# Patient Record
Sex: MALE | Race: WHITE | URBAN - METROPOLITAN AREA
[De-identification: names, ages, dates, MRNs, and addresses within clinical notes are randomized per-mention and may not be internally consistent; named-entity substitution may affect disease eponyms.]

---

## 2020-08-21 ENCOUNTER — TRANSCRIBE ORDERS (OUTPATIENT)
Dept: LAB | Facility: HOSPITAL | Age: 60
End: 2020-08-21

## 2022-10-13 ENCOUNTER — LAB REQUISITION (OUTPATIENT)
Dept: LAB | Facility: HOSPITAL | Age: 62
End: 2022-10-13
Payer: COMMERCIAL

## 2022-10-13 DIAGNOSIS — D23.111 OTHER BENIGN NEOPLASM OF SKIN OF RIGHT UPPER EYELID, INCLUDING CANTHUS: ICD-10-CM

## 2022-10-13 PROCEDURE — 88304 TISSUE EXAM BY PATHOLOGIST: CPT | Performed by: PATHOLOGY

## 2022-10-26 PROCEDURE — 88304 TISSUE EXAM BY PATHOLOGIST: CPT | Performed by: PATHOLOGY

## 2023-06-23 ENCOUNTER — TELEPHONE (OUTPATIENT)
Age: 63
End: 2023-06-23

## 2023-06-23 NOTE — TELEPHONE ENCOUNTER
[facility] Assessment    Name: Tory Salomon  YOB: 1960  Last Height: 6'  Last weight: 220 lb  BMI: 29 8  Procedure: Colon  Diagnosis: Hx polyps  Date of procedure: 8/23/23  Prep:   Responsible : Jackie Martin  Phone#: [phone]  Name completing form: Zabrina Lorenzo  Date form completed: 06/23/23      If the patient answers yes to any of these questions, schedule in a hospital  Are you pregnant: No  Do you rely on a wheelchair for mobility: No  Have you been diagnosed with End Stage Renal Disease (ESRD): No  Do you need oxygen during the day: No  Have you had a heart attack or stroke within the past three months: No  Have you had a seizure within the past three months: No  Have you ever been informed by anesthesia that you have a difficult airway: No  Additional Questions  Have you had any cardiac testing or are under the care of a Cardiologist (see cardiac list): No  Cardiac list:   Do you have an implanted cardiac defibrillator: No (Comment:  This patient should be scheduled in the hospital)    Have any bleeding problems, such as anemia or hemophilia (If patient has H&H result below 8, schedule in hospital   H&H must be within 30 days of procedure): No    Had an organ transplant within the past 3 months: No    Do you have any present infections: No  Do you get short of breath when walking a few blocks: No  Have you been diagnosed with diabetes: Yes  Comments (provide cardiac provider information if applicable):

## 2023-06-23 NOTE — TELEPHONE ENCOUNTER
06/23/23  Screened by: Kyler Leger    Referring Provider     Pre- Screening: There is no height or weight on file to calculate BMI  Has patient been referred for a routine screening Colonoscopy? yes  Is the patient between 39-70 years old? yes      Previous Colonoscopy yes   If yes:    Date: 12/12/2017    Facility:     Reason:       SCHEDULING STAFF: If the patient is between 45yrs-49yrs, please advise patient to confirm benefits/coverage with their insurance company for a routine screening colonoscopy, some insurance carriers will only cover at [id number] or older  If the patient is over 66years old, please schedule an office visit  Does the patient want to see a Gastroenterologist prior to their procedure OR are they having any GI symptoms? no    Has the patient been hospitalized or had abdominal surgery in the past 6 months? no    Does the patient use supplemental oxygen? no    Does the patient take Coumadin, Lovenox, Plavix, Elliquis, Xarelto, or other blood thinning medication? no    Has the patient had a stroke, cardiac event, or stent placed in the past year? no     PT PASSED OA    SCHEDULING STAFF: If patient answers NO to above questions, then schedule procedure  If patient answers YES to above questions, then schedule office appointment  If patient is between 45yrs - 49yrs, please advise patient that we will have to confirm benefits & coverage with their insurance company for a routine screening colonoscopy

## 2023-06-23 NOTE — TELEPHONE ENCOUNTER
Scheduled date of colonoscopy (as of today): 8/23/23  Physician performing colonoscopy: Dr Keenan Kellogg  Location of colonoscopy:  [facility]    Clearances: N//A

## 2023-08-08 ENCOUNTER — TELEPHONE (OUTPATIENT)
Dept: GASTROENTEROLOGY | Facility: CLINIC | Age: 63
End: 2023-08-08

## 2023-08-08 NOTE — TELEPHONE ENCOUNTER
Spoke to wife confirming her 's colonoscopy on 8/23. She or her son will be his , will be called day prior with arrival time and I am mailing the OTC prep.

## 2023-08-23 ENCOUNTER — ANESTHESIA (OUTPATIENT)
Dept: GASTROENTEROLOGY | Facility: AMBULARY SURGERY CENTER | Age: 63
End: 2023-08-23

## 2023-08-23 ENCOUNTER — ANESTHESIA EVENT (OUTPATIENT)
Dept: GASTROENTEROLOGY | Facility: AMBULARY SURGERY CENTER | Age: 63
End: 2023-08-23

## 2023-08-23 ENCOUNTER — HOSPITAL ENCOUNTER (OUTPATIENT)
Dept: GASTROENTEROLOGY | Facility: AMBULARY SURGERY CENTER | Age: 63
Setting detail: OUTPATIENT SURGERY
Discharge: HOME | End: 2023-08-23
Attending: INTERNAL MEDICINE
Payer: COMMERCIAL

## 2023-08-23 VITALS
OXYGEN SATURATION: 99 % | SYSTOLIC BLOOD PRESSURE: 171 MMHG | WEIGHT: 211 LBS | DIASTOLIC BLOOD PRESSURE: 91 MMHG | TEMPERATURE: 97.2 F | HEIGHT: 72 IN | RESPIRATION RATE: 18 BRPM | BODY MASS INDEX: 28.58 KG/M2 | HEART RATE: 60 BPM

## 2023-08-23 DIAGNOSIS — Z86.010 HISTORY OF COLON POLYPS: ICD-10-CM

## 2023-08-23 LAB — GLUCOSE SERPL-MCNC: 128 MG/DL (ref 65–140)

## 2023-08-23 PROCEDURE — 45380 COLONOSCOPY AND BIOPSY: CPT | Performed by: INTERNAL MEDICINE

## 2023-08-23 PROCEDURE — 82948 REAGENT STRIP/BLOOD GLUCOSE: CPT

## 2023-08-23 PROCEDURE — 88305 TISSUE EXAM BY PATHOLOGIST: CPT | Performed by: PATHOLOGY

## 2023-08-23 RX ORDER — METFORMIN HYDROCHLORIDE 500 MG/1
TABLET, EXTENDED RELEASE ORAL DAILY
COMMUNITY
Start: 2023-06-05

## 2023-08-23 RX ORDER — ATORVASTATIN CALCIUM 20 MG/1
TABLET, FILM COATED ORAL DAILY
COMMUNITY
Start: 2023-06-07

## 2023-08-23 RX ORDER — LOSARTAN POTASSIUM 50 MG/1
TABLET ORAL DAILY
COMMUNITY
Start: 2023-07-28

## 2023-08-23 RX ORDER — SODIUM CHLORIDE, SODIUM LACTATE, POTASSIUM CHLORIDE, CALCIUM CHLORIDE 600; 310; 30; 20 MG/100ML; MG/100ML; MG/100ML; MG/100ML
INJECTION, SOLUTION INTRAVENOUS CONTINUOUS PRN
Status: DISCONTINUED | OUTPATIENT
Start: 2023-08-23 | End: 2023-08-23

## 2023-08-23 RX ORDER — MULTIVITAMIN
1 TABLET ORAL DAILY
COMMUNITY

## 2023-08-23 RX ORDER — PROPOFOL 10 MG/ML
INJECTION, EMULSION INTRAVENOUS AS NEEDED
Status: DISCONTINUED | OUTPATIENT
Start: 2023-08-23 | End: 2023-08-23

## 2023-08-23 RX ADMIN — PROPOFOL 50 MG: 10 INJECTION, EMULSION INTRAVENOUS at 07:46

## 2023-08-23 RX ADMIN — PROPOFOL 50 MG: 10 INJECTION, EMULSION INTRAVENOUS at 07:41

## 2023-08-23 RX ADMIN — PROPOFOL 80 MG: 10 INJECTION, EMULSION INTRAVENOUS at 07:38

## 2023-08-23 RX ADMIN — PROPOFOL 130 MG: 10 INJECTION, EMULSION INTRAVENOUS at 07:35

## 2023-08-23 RX ADMIN — Medication 40 MG: at 07:40

## 2023-08-23 RX ADMIN — PROPOFOL 50 MG: 10 INJECTION, EMULSION INTRAVENOUS at 07:44

## 2023-08-23 RX ADMIN — PROPOFOL 40 MG: 10 INJECTION, EMULSION INTRAVENOUS at 07:37

## 2023-08-23 RX ADMIN — SODIUM CHLORIDE, SODIUM LACTATE, POTASSIUM CHLORIDE, AND CALCIUM CHLORIDE: .6; .31; .03; .02 INJECTION, SOLUTION INTRAVENOUS at 07:33

## 2023-08-23 NOTE — ANESTHESIA PREPROCEDURE EVALUATION
Procedure:  COLONOSCOPY    Relevant Problems   No relevant active problems     htn, dm, hld    Physical Exam    Airway    Mallampati score: II  TM Distance: >3 FB  Neck ROM: full     Dental       Cardiovascular  Cardiovascular exam normal    Pulmonary  Pulmonary exam normal     Other Findings        Anesthesia Plan  ASA Score- 2     Anesthesia Type- IV sedation with anesthesia with ASA Monitors. Additional Monitors:   Airway Plan:           Plan Factors-Exercise tolerance (METS): >4 METS. Chart reviewed. EKG reviewed. Imaging results reviewed. Existing labs reviewed. Patient summary reviewed. Patient is not a current smoker. Patient did not smoke on day of surgery. Obstructive sleep apnea risk education given perioperatively. Induction- intravenous. Postoperative Plan- Plan for postoperative opioid use. Informed Consent- Anesthetic plan and risks discussed with patient. I personally reviewed this patient with the CRNA. Discussed and agreed on the Anesthesia Plan with the CRNA. Richard Steiner

## 2023-08-23 NOTE — ANESTHESIA POSTPROCEDURE EVALUATION
Post-Op Assessment Note    CV Status:  Stable    Pain management: adequate     Mental Status:  Alert and awake   Hydration Status:  Euvolemic   PONV Controlled:  Controlled   Airway Patency:  Patent      Post Op Vitals Reviewed: Yes      Staff: Anesthesiologist, CRNA         No notable events documented.     BP  121/78   Temp 97   Pulse 67   Resp 12   SpO2 100

## 2023-08-23 NOTE — H&P
History and Physical -  [facility]  Kristopher Dugan 61 y.o. male MRN: [id number]        HPI: 61-year-old male with history of diabetes mellitus, hypertension, colon polyps. Regular bowel movements. Historical Information   Past Medical History:   Diagnosis Date   • Colon polyp    • Diabetes mellitus ([address])    • Hyperlipidemia    • Hypertension    • Seasonal allergies      Past Surgical History:   Procedure Laterality Date   • COLONOSCOPY     • EYE SURGERY       Social History   Social History     Substance and Sexual Activity   Alcohol Use Yes    Comment: daily     Social History     Substance and Sexual Activity   Drug Use Never     Social History     Tobacco Use   Smoking Status Some Days   • Types: Cigars   Smokeless Tobacco Never     Family History   Problem Relation Age of Onset   • Cancer Mother        Meds/Allergies     (Not in a hospital admission)      No Known Allergies    Objective     Blood pressure (!) 176/90, pulse 57, temperature (!) 96.7 °F (35.9 °C), temperature source Temporal, resp. rate 18, height 6' (1.829 m), weight 95.7 kg (211 lb), SpO2 99 %.     PHYSICAL EXAM:    Gen: NAD  CV: S1 & S2 normal, RRR  CHEST: Clear to auscultate  ABD: soft, NT/ND, good bowel sounds  EXT: no edema    ASSESSMENT:     History of colon polyps    PLAN:    Colonoscopy

## 2023-08-29 PROCEDURE — 88305 TISSUE EXAM BY PATHOLOGIST: CPT | Performed by: PATHOLOGY

## 2025-07-01 ENCOUNTER — HOSPITAL ENCOUNTER (INPATIENT)
Facility: HOSPITAL | Age: 65
LOS: 2 days | Discharge: HOME | End: 2025-07-03
Attending: EMERGENCY MEDICINE | Admitting: INTERNAL MEDICINE
Payer: COMMERCIAL

## 2025-07-01 ENCOUNTER — APPOINTMENT (EMERGENCY)
Dept: CT IMAGING | Facility: HOSPITAL | Age: 65
End: 2025-07-01
Payer: COMMERCIAL

## 2025-07-01 ENCOUNTER — APPOINTMENT (EMERGENCY)
Dept: RADIOLOGY | Facility: HOSPITAL | Age: 65
End: 2025-07-01
Payer: COMMERCIAL

## 2025-07-01 DIAGNOSIS — E87.1 HYPONATREMIA: Primary | ICD-10-CM

## 2025-07-01 DIAGNOSIS — R53.83 FATIGUE: ICD-10-CM

## 2025-07-01 DIAGNOSIS — D69.6 THROMBOCYTOPENIA (HCC): ICD-10-CM

## 2025-07-01 DIAGNOSIS — I73.9 PAD (PERIPHERAL ARTERY DISEASE) (HCC): ICD-10-CM

## 2025-07-01 DIAGNOSIS — F10.10 ALCOHOL ABUSE: ICD-10-CM

## 2025-07-01 DIAGNOSIS — R76.8 POSITIVE LYME DISEASE SEROLOGY: ICD-10-CM

## 2025-07-01 DIAGNOSIS — R42 DIZZINESS: ICD-10-CM

## 2025-07-01 PROBLEM — E11.9 DM2 (DIABETES MELLITUS, TYPE 2) (HCC): Status: ACTIVE | Noted: 2025-07-01

## 2025-07-01 PROBLEM — I10 HTN (HYPERTENSION): Status: ACTIVE | Noted: 2025-07-01

## 2025-07-01 PROBLEM — R79.89 ELEVATED LFTS: Status: ACTIVE | Noted: 2025-07-01

## 2025-07-01 LAB
ALBUMIN SERPL BCG-MCNC: 3.5 G/DL (ref 3.5–5)
ALP SERPL-CCNC: 73 U/L (ref 34–104)
ALT SERPL W P-5'-P-CCNC: 33 U/L (ref 7–52)
ANION GAP SERPL CALCULATED.3IONS-SCNC: 10 MMOL/L (ref 4–13)
ANION GAP SERPL CALCULATED.3IONS-SCNC: 7 MMOL/L (ref 4–13)
ANION GAP SERPL CALCULATED.3IONS-SCNC: 8 MMOL/L (ref 4–13)
ANION GAP SERPL CALCULATED.3IONS-SCNC: 8 MMOL/L (ref 4–13)
AST SERPL W P-5'-P-CCNC: 59 U/L (ref 13–39)
ATRIAL RATE: 107 BPM
BASOPHILS # BLD MANUAL: 0 THOUSAND/UL (ref 0–0.1)
BASOPHILS NFR MAR MANUAL: 0 % (ref 0–1)
BILIRUB SERPL-MCNC: 2.41 MG/DL (ref 0.2–1)
BUN SERPL-MCNC: 29 MG/DL (ref 5–25)
BUN SERPL-MCNC: 31 MG/DL (ref 5–25)
BUN SERPL-MCNC: 32 MG/DL (ref 5–25)
BUN SERPL-MCNC: 33 MG/DL (ref 5–25)
CALCIUM SERPL-MCNC: 8.2 MG/DL (ref 8.4–10.2)
CALCIUM SERPL-MCNC: 8.2 MG/DL (ref 8.4–10.2)
CALCIUM SERPL-MCNC: 8.3 MG/DL (ref 8.4–10.2)
CALCIUM SERPL-MCNC: 8.4 MG/DL (ref 8.4–10.2)
CHLORIDE SERPL-SCNC: 91 MMOL/L (ref 96–108)
CHLORIDE SERPL-SCNC: 95 MMOL/L (ref 96–108)
CHLORIDE SERPL-SCNC: 96 MMOL/L (ref 96–108)
CHLORIDE SERPL-SCNC: 96 MMOL/L (ref 96–108)
CO2 SERPL-SCNC: 22 MMOL/L (ref 21–32)
CO2 SERPL-SCNC: 23 MMOL/L (ref 21–32)
CREAT SERPL-MCNC: 0.92 MG/DL (ref 0.6–1.3)
CREAT SERPL-MCNC: 0.92 MG/DL (ref 0.6–1.3)
CREAT SERPL-MCNC: 0.99 MG/DL (ref 0.6–1.3)
CREAT SERPL-MCNC: 1.15 MG/DL (ref 0.6–1.3)
D DIMER PPP FEU-MCNC: 4.08 UG/ML FEU
EOSINOPHIL # BLD MANUAL: 0 THOUSAND/UL (ref 0–0.4)
EOSINOPHIL NFR BLD MANUAL: 0 % (ref 0–6)
ERYTHROCYTE [DISTWIDTH] IN BLOOD BY AUTOMATED COUNT: 13.9 % (ref 11.6–15.1)
EST. AVERAGE GLUCOSE BLD GHB EST-MCNC: 169 MG/DL
FOLATE SERPL-MCNC: >22.3 NG/ML
GFR SERPL CREATININE-BSD FRML MDRD: 66 ML/MIN/1.73SQ M
GFR SERPL CREATININE-BSD FRML MDRD: 79 ML/MIN/1.73SQ M
GFR SERPL CREATININE-BSD FRML MDRD: 86 ML/MIN/1.73SQ M
GFR SERPL CREATININE-BSD FRML MDRD: 86 ML/MIN/1.73SQ M
GLUCOSE SERPL-MCNC: 141 MG/DL (ref 65–140)
GLUCOSE SERPL-MCNC: 145 MG/DL (ref 65–140)
GLUCOSE SERPL-MCNC: 154 MG/DL (ref 65–140)
GLUCOSE SERPL-MCNC: 167 MG/DL (ref 65–140)
GLUCOSE SERPL-MCNC: 197 MG/DL (ref 65–140)
GLUCOSE SERPL-MCNC: 201 MG/DL (ref 65–140)
GLUCOSE SERPL-MCNC: 202 MG/DL (ref 65–140)
HBA1C MFR BLD: 7.5 %
HCT VFR BLD AUTO: 38 % (ref 36.5–49.3)
HGB BLD-MCNC: 13.1 G/DL (ref 12–17)
INR PPP: 1.08 (ref 0.85–1.19)
LYMPHOCYTES # BLD AUTO: 1.68 THOUSAND/UL (ref 0.6–4.47)
LYMPHOCYTES # BLD AUTO: 27 % (ref 14–44)
MCH RBC QN AUTO: 31.9 PG (ref 26.8–34.3)
MCHC RBC AUTO-ENTMCNC: 34.5 G/DL (ref 31.4–37.4)
MCV RBC AUTO: 93 FL (ref 82–98)
MONOCYTES # BLD AUTO: 2.04 THOUSAND/UL (ref 0–1.22)
MONOCYTES NFR BLD: 34 % (ref 4–12)
NEUTROPHILS # BLD MANUAL: 2.28 THOUSAND/UL (ref 1.85–7.62)
NEUTS BAND NFR BLD MANUAL: 2 % (ref 0–8)
NEUTS SEG NFR BLD AUTO: 36 % (ref 43–75)
OSMOLALITY UR/SERPL-RTO: 294 MMOL/KG (ref 282–298)
OSMOLALITY UR: 745 MMOL/KG (ref 250–900)
P AXIS: 54 DEGREES
PLATELET # BLD AUTO: 67 THOUSANDS/UL (ref 149–390)
PLATELET BLD QL SMEAR: ABNORMAL
PMV BLD AUTO: 10.2 FL (ref 8.9–12.7)
POLYCHROMASIA BLD QL SMEAR: PRESENT
POTASSIUM SERPL-SCNC: 3.9 MMOL/L (ref 3.5–5.3)
POTASSIUM SERPL-SCNC: 4.1 MMOL/L (ref 3.5–5.3)
POTASSIUM SERPL-SCNC: 4.2 MMOL/L (ref 3.5–5.3)
POTASSIUM SERPL-SCNC: 4.3 MMOL/L (ref 3.5–5.3)
PR INTERVAL: 178 MS
PROT SERPL-MCNC: 8 G/DL (ref 6.4–8.4)
PROTHROMBIN TIME: 14.8 SECONDS (ref 12.3–15)
QRS AXIS: -36 DEGREES
QRSD INTERVAL: 114 MS
QT INTERVAL: 344 MS
QTC INTERVAL: 459 MS
RBC # BLD AUTO: 4.11 MILLION/UL (ref 3.88–5.62)
RBC MORPH BLD: PRESENT
SODIUM SERPL-SCNC: 123 MMOL/L (ref 135–147)
SODIUM SERPL-SCNC: 126 MMOL/L (ref 135–147)
SODIUM SERPL-SCNC: 126 MMOL/L (ref 135–147)
SODIUM SERPL-SCNC: 127 MMOL/L (ref 135–147)
SODIUM UR-SCNC: 21 MMOL/L
T WAVE AXIS: 43 DEGREES
T4 FREE SERPL-MCNC: 1.02 NG/DL (ref 0.61–1.12)
TSH SERPL DL<=0.05 MIU/L-ACNC: 4.92 UIU/ML (ref 0.45–4.5)
VARIANT LYMPHS # BLD AUTO: 1 %
VENTRICULAR RATE: 107 BPM
VIT B12 SERPL-MCNC: 1696 PG/ML (ref 180–914)
WBC # BLD AUTO: 6.01 THOUSAND/UL (ref 4.31–10.16)

## 2025-07-01 PROCEDURE — 85610 PROTHROMBIN TIME: CPT | Performed by: PHYSICIAN ASSISTANT

## 2025-07-01 PROCEDURE — 99285 EMERGENCY DEPT VISIT HI MDM: CPT | Performed by: EMERGENCY MEDICINE

## 2025-07-01 PROCEDURE — 82746 ASSAY OF FOLIC ACID SERUM: CPT | Performed by: INTERNAL MEDICINE

## 2025-07-01 PROCEDURE — 86618 LYME DISEASE ANTIBODY: CPT | Performed by: PHYSICIAN ASSISTANT

## 2025-07-01 PROCEDURE — 36415 COLL VENOUS BLD VENIPUNCTURE: CPT

## 2025-07-01 PROCEDURE — 80053 COMPREHEN METABOLIC PANEL: CPT

## 2025-07-01 PROCEDURE — 84439 ASSAY OF FREE THYROXINE: CPT | Performed by: PHYSICIAN ASSISTANT

## 2025-07-01 PROCEDURE — 83935 ASSAY OF URINE OSMOLALITY: CPT | Performed by: PHYSICIAN ASSISTANT

## 2025-07-01 PROCEDURE — 84300 ASSAY OF URINE SODIUM: CPT | Performed by: PHYSICIAN ASSISTANT

## 2025-07-01 PROCEDURE — 82948 REAGENT STRIP/BLOOD GLUCOSE: CPT

## 2025-07-01 PROCEDURE — 86617 LYME DISEASE ANTIBODY: CPT | Performed by: PHYSICIAN ASSISTANT

## 2025-07-01 PROCEDURE — 93010 ELECTROCARDIOGRAM REPORT: CPT | Performed by: INTERNAL MEDICINE

## 2025-07-01 PROCEDURE — 71046 X-RAY EXAM CHEST 2 VIEWS: CPT

## 2025-07-01 PROCEDURE — 83930 ASSAY OF BLOOD OSMOLALITY: CPT | Performed by: PHYSICIAN ASSISTANT

## 2025-07-01 PROCEDURE — 85379 FIBRIN DEGRADATION QUANT: CPT

## 2025-07-01 PROCEDURE — 84443 ASSAY THYROID STIM HORMONE: CPT | Performed by: PHYSICIAN ASSISTANT

## 2025-07-01 PROCEDURE — 85007 BL SMEAR W/DIFF WBC COUNT: CPT

## 2025-07-01 PROCEDURE — 71275 CT ANGIOGRAPHY CHEST: CPT

## 2025-07-01 PROCEDURE — 83036 HEMOGLOBIN GLYCOSYLATED A1C: CPT | Performed by: PHYSICIAN ASSISTANT

## 2025-07-01 PROCEDURE — 82607 VITAMIN B-12: CPT | Performed by: PHYSICIAN ASSISTANT

## 2025-07-01 PROCEDURE — 74177 CT ABD & PELVIS W/CONTRAST: CPT

## 2025-07-01 PROCEDURE — 82746 ASSAY OF FOLIC ACID SERUM: CPT | Performed by: PHYSICIAN ASSISTANT

## 2025-07-01 PROCEDURE — 93005 ELECTROCARDIOGRAM TRACING: CPT

## 2025-07-01 PROCEDURE — 99285 EMERGENCY DEPT VISIT HI MDM: CPT

## 2025-07-01 PROCEDURE — 80048 BASIC METABOLIC PNL TOTAL CA: CPT | Performed by: PHYSICIAN ASSISTANT

## 2025-07-01 PROCEDURE — 99223 1ST HOSP IP/OBS HIGH 75: CPT | Performed by: PHYSICIAN ASSISTANT

## 2025-07-01 PROCEDURE — 85027 COMPLETE CBC AUTOMATED: CPT

## 2025-07-01 RX ORDER — LANOLIN ALCOHOL/MO/W.PET/CERES
100 CREAM (GRAM) TOPICAL DAILY
Status: DISCONTINUED | OUTPATIENT
Start: 2025-07-02 | End: 2025-07-03 | Stop reason: HOSPADM

## 2025-07-01 RX ORDER — ATORVASTATIN CALCIUM 20 MG/1
20 TABLET, FILM COATED ORAL
Status: DISCONTINUED | OUTPATIENT
Start: 2025-07-01 | End: 2025-07-03 | Stop reason: HOSPADM

## 2025-07-01 RX ORDER — AZELASTINE 1 MG/ML
1 SPRAY, METERED NASAL 2 TIMES DAILY
Status: DISCONTINUED | OUTPATIENT
Start: 2025-07-01 | End: 2025-07-03 | Stop reason: HOSPADM

## 2025-07-01 RX ORDER — AMLODIPINE BESYLATE 5 MG/1
5 TABLET ORAL DAILY
Status: DISCONTINUED | OUTPATIENT
Start: 2025-07-01 | End: 2025-07-01

## 2025-07-01 RX ORDER — AZELASTINE HYDROCHLORIDE 137 UG/1
1-2 SPRAY, METERED NASAL 2 TIMES DAILY
COMMUNITY
Start: 2025-06-20

## 2025-07-01 RX ORDER — ONDANSETRON 2 MG/ML
4 INJECTION INTRAMUSCULAR; INTRAVENOUS EVERY 6 HOURS PRN
Status: DISCONTINUED | OUTPATIENT
Start: 2025-07-01 | End: 2025-07-03 | Stop reason: HOSPADM

## 2025-07-01 RX ORDER — AMLODIPINE BESYLATE 5 MG/1
5 TABLET ORAL DAILY
COMMUNITY
Start: 2025-03-11 | End: 2025-07-03

## 2025-07-01 RX ORDER — INSULIN LISPRO 100 [IU]/ML
1-5 INJECTION, SOLUTION INTRAVENOUS; SUBCUTANEOUS
Status: DISCONTINUED | OUTPATIENT
Start: 2025-07-01 | End: 2025-07-03 | Stop reason: HOSPADM

## 2025-07-01 RX ADMIN — IOHEXOL 85 ML: 350 INJECTION, SOLUTION INTRAVENOUS at 11:24

## 2025-07-01 RX ADMIN — AZELASTINE 1 SPRAY: 1 SPRAY, METERED NASAL at 21:16

## 2025-07-01 RX ADMIN — SODIUM CHLORIDE 1000 ML: 0.9 INJECTION, SOLUTION INTRAVENOUS at 10:20

## 2025-07-01 RX ADMIN — ATORVASTATIN CALCIUM 20 MG: 20 TABLET, FILM COATED ORAL at 17:29

## 2025-07-01 NOTE — ASSESSMENT & PLAN NOTE
Patient presented with generalized weakness, fatigue, lightheadedness and significant decreased appetite. Also episodes of nausea and vomiting noted in the past 2 days  No prior labs with which to compare since 1 year ago at which point sodium level was normal  Likely hyponatremia is due to significant decrease in p.o. intake for approximately a week.  Has excessive fluid intake>>> solute intake  Received 1 L of normal saline on admission  Check osmolality studies and TSH   encourage solute intake as tolerated  Check BMP every 6 hours for now   Consider nephrology consult

## 2025-07-01 NOTE — H&P
H&P - Hospitalist   Name: Robbie Cordero 65 y.o. male I MRN: [id number]  Unit/Bed#: W -01 I Date of Admission: 7/1/2025   Date of Service: 7/1/2025 I Hospital Day: 0     Assessment & Plan  Hyponatremia  Patient presented with generalized weakness, fatigue, lightheadedness and significant decreased appetite. Also episodes of nausea and vomiting noted in the past 2 days  No prior labs with which to compare since 1 year ago at which point sodium level was normal  Likely hyponatremia is due to significant decrease in p.o. intake for approximately a week.  Has excessive fluid intake>>> solute intake  Received 1 L of normal saline on admission  Check osmolality studies and TSH   encourage solute intake as tolerated  Check BMP every 6 hours for now   Consider nephrology consult  Thrombocytopenia (HCC)  Platelet count 67, new compared to 1 year ago  Possibly due to alcohol abuse versus untreated Lyme versus other  Recheck in a.m.  Noted increased monocytes on diff but no mono symptoms  Lyme disease  Lyme studies positive December 2024 as noted by chart review of [facility].  Patient however states he was told he was negative for Lyme disease and given 1 dose of doxycycline but chart review from [facility] clearly reflects prescription for 2 weeks of doxycycline. Reports that he had classic target lesion and rash at the time  Unclear if some of his current symptoms including thrombocytopenia and generalized fatigue could also be attributed to this  Recheck Lyme and consider d/w ID if patient was in fact not compliant with the prescribed Lyme disease treatment  DM2 (diabetes mellitus, type 2) ([facility])  Lab Results   Component Value Date    HGBA1C 7.6 (H) 12/19/2024   A1c at [facility] 7.2  Recent Labs     07/01/25  0922   POCGLU 202*   Hold metformin and Jardiance.  Discontinue Amaryl which patient reported intolerance to  Monitor on Accu-Cheks with sliding scale coverage    Blood Sugar Average: Last 72 hrs:  (P)  202    Elevated LFTs  Chronic very mildly LFTs with AST 59/ ALT 33.  However bilirubin is notably increased today to 2.41  CAT scan on admission showed hepatic steatosis without evidence of cirrhosis; no biliary ductal dilatation  Trend LFTs daily    Alcohol abuse  Patient admits to drinking a couple cases of beer per week for 50 years according to his wife.  Has not been able to drink alcohol for the past 3 days due to his acute illness  Monitor on CIWA protocol, consult catch  Admits to 3/4 of the CAGE questions (no eye opener drink)  Check B12 and folate plus orthostatics given reports of events of standing up and falling in the night   HTN (hypertension)  Per discussion with his PCPs office he is on Norvasc 5 mg daily and Cozaar 50 mg twice a day  Would hold both of these medications at this time in the context of borderline hypotension present on admission      VTE Pharmacologic Prophylaxis: VTE Score: 3 SCDs due to low platelets  Code Status: Level 1 - Full Code   Discussion with family: Updated  (wife) at bedside.    Anticipated Length of Stay: Patient will be admitted on an inpatient basis with an anticipated length of stay of greater than 2 midnights secondary to low sodium.    History of Present Illness     Chief Complaint: fatigue and lightheadedness    Robbie Cordero is a 65 y.o. male with a PMH of hypertension, diabetes and longstanding alcohol abuse who presents with severe fatigue, generalized weakness and lightheadedness.  At baseline patient is typically a very regimented good eater and active but does tend to drink at least 2 cases of beer per week.  On June 20 his PCP felt his A1c was too high at 7.2 and told him to discontinue his metformin and start glimepiride 1 mg daily.  Patient states that as soon as he made this change he immediately did not feel well.  However he continued to take the medicine for about 5 days and stopped it around June 26.  Despite having stopped the  medication he continued to feel ill and his wife notes that he essentially stopped eating entirely.  He continued to drink lots of fluids including water and some Powerade and also continue to drink beer until just 3 days ago at which point he stopped drinking alcohol because he was feeling ill.  He missed work yesterday which his wife states he has never done in his life.  He had nausea and vomiting over the past 2 days and no diarrhea    Aside from recently taking some colchicine for what felt like his stereotypical right toe gout, he has not had any use of NSAIDs or other new medications    Review of Systems   Constitutional:  Positive for activity change, appetite change, chills (3 to 4 days ago) and fatigue. Negative for diaphoresis, fever and unexpected weight change.   HENT:  Negative for congestion, nosebleeds, rhinorrhea, sore throat, trouble swallowing and voice change.    Eyes:  Negative for photophobia.        Fuzzy vision yesterday   Respiratory:  Positive for apnea. Negative for cough, choking, chest tightness, shortness of breath, wheezing and stridor.         Mucous in am   Cardiovascular:  Negative for chest pain, palpitations and leg swelling.   Gastrointestinal:  Positive for nausea and vomiting. Negative for abdominal distention, abdominal pain, anal bleeding, blood in stool, constipation and diarrhea.   Genitourinary:  Negative for decreased urine volume, difficulty urinating, dysuria, frequency, hematuria and urgency.   Musculoskeletal:  Negative for arthralgias, back pain, gait problem, joint swelling, myalgias, neck pain and neck stiffness.        Wife reports patient has had episodes in the past of getting up to go to the bathroom in the middle night feeling dizzy and falling.  Recent event of toe gout   Skin:  Positive for rash (Back in December patient reports that he had bull's-eye rash following a tick on his right chest wall). Negative for color change, pallor and wound.   Neurological:   Positive for dizziness, weakness and light-headedness. Negative for tremors, seizures, syncope, facial asymmetry, speech difficulty, numbness and headaches.   Hematological:  Negative for adenopathy. Does not bruise/bleed easily.   Psychiatric/Behavioral:  Negative for agitation, behavioral problems, confusion, dysphoric mood and hallucinations.        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  Prior to Admission medications    Medication Sig Start Date End Date Taking? Authorizing Provider   amLODIPine (NORVASC) 5 mg tablet Take 5 mg by mouth daily 3/11/25  Yes Historical Provider, MD   atorvastatin (LIPITOR) 20 mg tablet Take by mouth in the morning 6/7/23   Historical Provider, MD   losartan (COZAAR) 50 mg tablet BID 7/28/23   Historical Provider, MD   metFORMIN (GLUCOPHAGE-XR) 500 mg 24 hr tablet in the morning 6/5/23 This was stopped  Historical Provider, MD   MILK THISTLE PO Take by mouth in the morning    Historical Provider, MD   Multiple Vitamin (multivitamin) tablet Take 1 tablet by mouth daily    Historical Provider, MD   Multiple Vitamins-Minerals (ZINC PO) Take by mouth in the morning    Historical Provider, MD   Probiotic Product (PROBIOTIC PO) Take by mouth in the morning    Historical Provider, MD   VITAMIN D PO Take by mouth in the morning    Historical Provider, MD     No Known Allergies    Objective :  Temp:  [98.2 °F (36.8 °C)] 98.2 °F (36.8 °C)  HR:  [] 98  BP:  ()/(61-66) 93/62  Resp:  [18-20] 18  SpO2:  [93 %-95 %] 94 %  O2 Device: None (Room air)    Physical Exam  Vitals reviewed.   Constitutional:       General: He is not in acute distress.     Appearance: He is ill-appearing. He is not toxic-appearing or diaphoretic.      Comments: Appears very fatigued   HENT:      Nose: No congestion or rhinorrhea.      Mouth/Throat:      Mouth: Mucous membranes are moist.      Pharynx: Oropharynx is clear. No oropharyngeal exudate.     Eyes:      General: No scleral icterus.        Right eye: No discharge.         Left eye: No discharge.      Conjunctiva/sclera: Conjunctivae normal.       Cardiovascular:      Rate and Rhythm: Normal rate and regular rhythm.      Heart sounds: No murmur heard.  Pulmonary:      Effort: No respiratory distress.      Breath sounds: Normal breath sounds. No stridor. No wheezing, rhonchi or rales.   Abdominal:      General: There is no distension.      Palpations: Abdomen is soft.      Tenderness: There is no abdominal tenderness. There is no guarding.     Musculoskeletal:         General: No swelling, tenderness, deformity or signs of injury.      Right lower leg: No edema.      Left lower leg: No edema.     Skin:     General: Skin is warm and dry.      Coloration: Skin is not jaundiced or pale.      Findings: No bruising, erythema, lesion or rash.     Neurological:      Mental Status: He is alert.      Comments: Awake alert interactive, no tremors.  No confusion   Psychiatric:         Mood and Affect: Mood normal.          Lines/Drains:        Lab Results: I have reviewed the following results:  Results from last 7 days   Lab Units 07/01/25  1018   WBC Thousand/uL 6.01   HEMOGLOBIN g/dL 13.1   HEMATOCRIT % 38.0   PLATELETS Thousands/uL 67*   BANDS PCT % 2   LYMPHO PCT % 27   MONO PCT % 34*   EOS PCT % 0     Results from last 7 days   Lab Units 07/01/25  1018   SODIUM mmol/L 123*   POTASSIUM mmol/L 3.9   CHLORIDE mmol/L 91*   CO2 mmol/L 22   BUN  mg/dL 31*   CREATININE mg/dL 1.15   ANION GAP mmol/L 10   CALCIUM mg/dL 8.4   ALBUMIN g/dL 3.5   TOTAL BILIRUBIN mg/dL 2.41*   ALK PHOS U/L 73   ALT U/L 33   AST U/L 59*   GLUCOSE RANDOM mg/dL 197*         Results from last 7 days   Lab Units 07/01/25  0922   POC GLUCOSE mg/dl 202*     Lab Results   Component Value Date    HGBA1C 7.6 (H) 12/19/2024    HGBA1C 7.4 (H) 12/29/2023    HGBA1C 8 (H) 01/27/2023         CT C/A/P  Telemetry with occasional PVCs  Administrative Statements       ** Please Note: This note has been constructed using a voice recognition system. **         [1]   Past Medical History:  Diagnosis Date    Colon polyp     Diabetes mellitus (HCC)     Hyperlipidemia     Hypertension     Seasonal allergies    [2]   Past Surgical History:  Procedure Laterality Date    COLONOSCOPY      EYE SURGERY     [3]   Social History  Tobacco Use    Smoking status: Some Days     Types: Cigars    Smokeless tobacco: Never   Vaping Use    Vaping status: Never Used   Substance and Sexual Activity    Alcohol use: Yes     Comment: daily    Drug use: Never

## 2025-07-01 NOTE — ASSESSMENT & PLAN NOTE
Per discussion with his PCPs office he is on Norvasc 5 mg daily and Cozaar 50 mg twice a day  Would hold both of these medications at this time in the context of borderline hypotension present on admission

## 2025-07-01 NOTE — ED PROVIDER NOTES
Time reflects when diagnosis was documented in both MDM as applicable and the Disposition within this note       Time User Action Codes Description Comment    7/1/2025  1:46 PM Caity Hannah [E87.1] Hyponatremia     7/1/2025  1:46 PM Caity Hannah [R53.83] Fatigue     7/1/2025  1:47 PM Caity Hannah [R42] Dizziness     7/2/2025  1:17 PM Stella Pineda [D69.6] Thrombocytopenia (HCC)     7/2/2025  1:17 PM Stella Pineda [R76.8] Lyme disease     7/3/2025  9:55 AM Stella Pineda [F10.10] Alcohol abuse     7/3/2025 10:18 AM Stella Pineda [I73.9] PAD (peripheral artery disease) (HCC)           ED Disposition       ED Disposition   Admit    Condition   Stable    Date/Time   Tue Jul 1, 2025  1:45 PM    Comment   Case was discussed with DOROTHY and the patient's admission status was agreed to be Admission Status: inpatient status to the service of Dr. Marin .               Assessment & Plan       Medical Decision Making  Amount and/or Complexity of Data Reviewed  Labs: ordered. Decision-making details documented in ED Course.  Radiology: ordered.    Risk  Prescription drug management.  Decision regarding hospitalization.      65 year old male PMH T2DM, HTN and HLD presenting to the ED for generalized weakness.   Differential includes medication related, dehydration, electrolyte abnormalities, viral syndrome, PE.   CBC unremarkable. CMP with hyponatremia.  Patient with SOB, clear lung sounds, tachycardic and with SpO2 of 93% prompting d-dimer. D-dimer was elevated. CTA c/a/p without concerning findings.   Patient was given fluid bolus and admitted to medicine service for hyponatremia.     ED Course as of 07/05/25 2121 Tue Jul 01, 2025   1104 Sodium(!): 123   1104 ANION GAP: 10  No, less concern for DKA, euglycemic DKA   1104 Total Bilirubin(!): 2.41   1105 D-Dimer, Quant(!): 4.08  Obtaining PE study       Medications   sodium chloride 0.9 % bolus 1,000 mL (0 mL Intravenous Stopped 7/1/25 1020)    iohexol (OMNIPAQUE) 350 MG/ML injection (MULTI-DOSE) 100 mL (85 mL Intravenous Given 7/1/25 1124)       ED Risk Strat Scores                    No data recorded        SBIRT 20yo+      Flowsheet Row Most Recent Value   Initial Alcohol Screen: US AUDIT-C     1. How often do you have a drink containing alcohol? 0 Filed at: 07/01/2025 0933   2. How many drinks containing alcohol do you have on a typical day you are drinking?  0 Filed at: 07/01/2025 0933   3a. Male UNDER 65: How often do you have five or more drinks on one occasion? 0 Filed at: 07/01/2025 0933   3b. FEMALE Any Age, or MALE 65+: How often do you have 4 or more drinks on one occassion? 0 Filed at: 07/01/2025 0933   Audit-C Score 0 Filed at: 07/01/2025 0933   SHYAM: How many times in the past year have you...    Used an illegal drug or used a prescription medication for non-medical reasons? Never Filed at: 07/01/2025 0933                            History of Present Illness       Chief Complaint   Patient presents with    Weakness - Generalized     Patient states within the last week started on Glimepiride and since then has lost his appetite has been feeling very dizzy and weak        Past Medical History[1]   Past Surgical History[2]   Family History[3]   Social History[4]   E-Cigarette/Vaping    E-Cigarette Use Never User       E-Cigarette/Vaping Substances      I have reviewed and agree with the history as documented.     HPI    65 year old male PMH T2DM, HTN and HLD presenting to the ED for generalized weakness. Patient was switched from metformin and Jardiance to glimepiride about one week ago. Since then, he has felt fatigued, nauseous, has been vomiting and had decreased appetite. He has difficult ambulating over the last few days which prompted visit to the ED. He states he also has SOB that is present at rest, but denies any cough, nasal congestion or fevers.     Review of Systems   Constitutional:  Positive for appetite change. Negative for  chills and fever.   HENT:  Negative for congestion and rhinorrhea.    Eyes:  Negative for visual disturbance.   Respiratory:  Positive for shortness of breath. Negative for cough.    Cardiovascular:  Negative for chest pain and palpitations.   Gastrointestinal:  Positive for nausea and vomiting. Negative for abdominal pain.   Genitourinary:  Negative for dysuria and hematuria.   Musculoskeletal:  Negative for back pain and neck pain.   Neurological:  Positive for light-headedness. Negative for dizziness, weakness, numbness and headaches.           Objective       ED Triage Vitals   Temperature Pulse Blood Pressure Respirations SpO2 Patient Position - Orthostatic VS   07/01/25 0922 07/01/25 0920 07/01/25 0922 07/01/25 0920 07/01/25 0922 07/01/25 1439   98.2 °F (36.8 °C) 105 107/66 20 95 % Lying      Temp src Heart Rate Source BP Location FiO2 (%) Pain Score    -- 07/01/25 1439 07/01/25 1439 -- 07/01/25 1400     Monitor Right arm  No Pain      Vitals      Date and Time Temp Pulse SpO2 Resp BP Pain Score FACES Pain Rating User   07/03/25 1130 -- -- -- -- -- No Pain -- KK   07/03/25 0700 98.6 °F (37 °C) 84 98 % 18 134/71 -- -- DII   07/02/25 2231 98.7 °F (37.1 °C) 87 97 % -- 130/80 -- -- DII   07/02/25 2157 -- -- -- -- -- No Pain -- CLS   07/02/25 1906 98.3 °F (36.8 °C) 104 98 % -- 93/67 -- -- DII   07/02/25 1619 98.5 °F (36.9 °C) 96 96 % -- 128/71 -- -- DII   07/02/25 1345 -- 91 -- -- 126/69 -- -- MAL   07/02/25 1102 97.7 °F (36.5 °C) 95 97 % -- 126/69 -- -- DII   07/02/25 0900 -- -- 95 % -- -- No Pain -- GF   07/02/25 0719 98.2 °F (36.8 °C) 90 97 % -- 115/71 -- -- DII   07/02/25 0000 -- -- -- -- -- No Pain -- ER   07/01/25 1904 98.2 °F (36.8 °C) 97 96 % -- 93/61 -- -- [facility]   07/01/25 1904 98.2 °F (36.8 °C) 99 96 % -- 93/61 -- -- [facility]   07/01/25 1700 -- -- -- -- -- No Pain --    07/01/25 1522 -- 98 -- -- -- -- --    07/01/25 1522 98.2 °F (36.8 °C) -- -- 18 93/62 -- -- [facility]   07/01/25 1501 -- -- 95 % -- -- -- --     07/01/25 1439 -- 98 94 % 20 113/64 -- -- [place]   07/01/25 1430 -- 94 93 % -- 113/64 -- --    07/01/25 1400 -- -- -- -- -- No Pain --    07/01/25 1245 -- 98 93 % -- -- -- --    07/01/25 1100 -- 97 93 % -- 115/61 -- --    07/01/25 1030 -- 99 95 % -- 96/63 -- --    07/01/25 1000 -- 99 93 % -- 99/62 -- --    07/01/25 0930 -- 100 93 % -- 104/62 -- --    07/01/25 0922 98.2 °F (36.8 °C) -- 95 % -- 107/66 -- -- AP   07/01/25 0920 -- 105 -- 20 -- -- -- AP            Physical Exam  Constitutional:       General: He is not in acute distress.     Appearance: He is normal weight. He is ill-appearing.   HENT:      Head: Normocephalic and atraumatic.      Mouth/Throat:      Mouth: Mucous membranes are moist.      Pharynx: Oropharynx is clear.     Eyes:      Extraocular Movements: Extraocular movements intact.      Conjunctiva/sclera: Conjunctivae normal.      Pupils: Pupils are equal, round, and reactive to light.       Cardiovascular:      Rate and Rhythm: Normal rate and regular rhythm.      Pulses: Normal pulses.      Heart sounds: Normal heart sounds.   Pulmonary:      Effort: Pulmonary effort is normal. No respiratory distress.      Breath sounds: Normal breath sounds.   Abdominal:      General: There is no distension.      Palpations: Abdomen is soft.      Tenderness: There is no abdominal tenderness. There is no guarding or rebound.     Musculoskeletal:         General: No deformity. Normal range of motion.      Cervical back: Normal range of motion. No rigidity.     Skin:     General: Skin is warm and dry.      Capillary Refill: Capillary refill takes less than 2 seconds.     Neurological:      General: No focal deficit present.      Mental Status: He is alert and oriented to person, place, and time. Mental status is at baseline.     Psychiatric:         Mood and Affect: Mood normal.         Behavior: Behavior normal.         Results Reviewed       Procedure Component Value Units Date/Time    RBC Morphology  Reflex Test [[id number]] Collected: 07/01/25 1018    Lab Status: Final result Specimen: Blood from Arm, Left Updated: 07/01/25 1101    CBC and differential [[id number]]  (Abnormal) Collected: 07/01/25 1018    Lab Status: Final result Specimen: Blood from Arm, Left Updated: 07/01/25 1059     WBC 6.01 Thousand/uL      RBC 4.11 Million/uL      Hemoglobin 13.1 g/dL      Hematocrit 38.0 %      MCV 93 fL      MCH 31.9 pg      MCHC 34.5 g/dL      RDW 13.9 %      MPV 10.2 fL      Platelets 67 Thousands/uL     Manual Differential(PHLEBS Do Not Order) [[id number]]  (Abnormal) Collected: 07/01/25 1018    Lab Status: Final result Specimen: Blood from Arm, Left Updated: 07/01/25 1059     Segmented % 36 %      Bands % 2 %      Lymphocytes % 27 %      Monocytes % 34 %      Eosinophils % 0 %      Basophils % 0 %      Atypical Lymphocytes % 1 %      Absolute Neutrophils 2.28 Thousand/uL      Absolute Lymphocytes 1.68 Thousand/uL      Absolute Monocytes 2.04 Thousand/uL      Absolute Eosinophils 0.00 Thousand/uL      Absolute Basophils 0.00 Thousand/uL      Total Counted --     RBC Morphology Present     Platelet Estimate Decreased     Polychromasia Present    Comprehensive metabolic panel [[id number]]  (Abnormal) Collected: 07/01/25 1018    Lab Status: Final result Specimen: Blood from Arm, Left Updated: 07/01/25 1052     Sodium 123 mmol/L      Potassium 3.9 mmol/L      Chloride 91 mmol/L      CO2 22 mmol/L      ANION GAP 10 mmol/L      BUN 31 mg/dL      Creatinine 1.15 mg/dL      Glucose 197 mg/dL      Calcium 8.4 mg/dL      AST 59 U/L      ALT 33 U/L      Alkaline Phosphatase 73 U/L      Total Protein 8.0 g/dL      Albumin 3.5 g/dL      Total Bilirubin 2.41 mg/dL      eGFR 66 ml/min/1.73sq m     Narrative:      National Kid[employer] Disease Foundation guidelines for Chronic Kidney Disease (CKD):     Stage 1 with normal or high GFR (GFR > 90 mL/min/1.73 square meters)    Stage 2 Mild CKD (GFR = 60-89 mL/min/1.73 square meters)    Stage 3A  Moderate CKD (GFR = 45-59 mL/min/1.73 square meters)    Stage 3B Moderate CKD (GFR = 30-44 mL/min/1.73 square meters)    Stage 4 Severe CKD (GFR = 15-29 mL/min/1.73 square meters)    Stage 5 End Stage CKD (GFR <15 mL/min/1.73 square meters)  Note: GFR calculation is accurate only with a steady state creatinine    D-dimer, quantitative [[id number]]  (Abnormal) Collected: 07/01/25 1018    Lab Status: Final result Specimen: Blood from Arm, Left Updated: 07/01/25 1050     D-Dimer, Quant 4.08 ug/ml FEU     Narrative:      In the evaluation for possible pulmonary embolism, in the appropriate (Well's Score of 4 or less) patient, the age adjusted d-dimer cutoff for this patient can be calculated as:    Age x 0.01 (in ug/mL) for Age-adjusted D-dimer exclusion threshold for a patient over 50 years.    Fingerstick Glucose (POCT) [[id number]]  (Abnormal) Collected: 07/01/25 0922    Lab Status: Final result Specimen: Blood Updated: 07/01/25 0923     POC Glucose 202 mg/dl              VAS VENOUS DUPLEX - LOWER LIMB BILATERAL   Final Interpretation by Lokesh Chavira MD (07/02 1056)      CT pe study w abdomen pelvis w contrast   Final Interpretation by Karan Figueroa MD (07/01 1324)   1.  No pulmonary emboli or other acute thoracic abnormalities.      2.  No acute abdominopelvic findings.            Workstation performed: [id number]         XR chest 2 views   Final Interpretation by Karan Figueroa MD (07/01 1324)   1.  No pulmonary emboli or other acute thoracic abnormalities.      2.  No acute abdominopelvic findings.            Workstation performed: [id number]             ECG 12 Lead Documentation Only    Date/Time: 7/1/2025 9:30 AM    Performed by: Caity Hannah MD  Authorized by: Caity Hannah MD    Indications / Diagnosis:  Weakness  ECG reviewed by me, the ED Provider: yes    Patient location:  ED  Previous ECG:     Previous ECG:  Unavailable  Interpretation:     Interpretation: normal    Rate:     ECG rate:   107    ECG rate assessment: tachycardic    Rhythm:     Rhythm: sinus rhythm    Ectopy:     Ectopy: none    QRS:     QRS axis:  Left    QRS intervals:  Normal  Conduction:     Conduction: normal    ST segments:     ST segments:  Normal  T waves:     T waves: normal        ED Medication and Procedure Management   Prior to Admission Medications   Prescriptions Last Dose Informant Patient Reported? Taking?   Azelastine HCl 137 MCG/SPRAY SOLN   Yes Yes   Si-2 sprays into each nostril 2 (two) times a day   Empagliflozin 25 MG TABS   Yes Yes   Sig: Take 25 mg by mouth daily   MILK THISTLE PO 2025  Yes Yes   Sig: Take by mouth in the morning   Multiple Vitamin (multivitamin) tablet 2025  Yes Yes   Sig: Take 1 tablet by mouth in the morning.   Multiple Vitamins-Minerals (ZINC PO) 2025  Yes Yes   Sig: Take by mouth in the morning   Probiotic Product (PROBIOTIC PO) 2025  Yes Yes   Sig: Take by mouth in the morning   VITAMIN D PO 2025  Yes Yes   Sig: Take by mouth in the morning   amLODIPine (NORVASC) 5 mg tablet 2025  Yes Yes   Sig: Take 5 mg by mouth daily   atorvastatin (LIPITOR) 20 mg tablet 2025  Yes Yes   Sig: Take by mouth in the morning   losartan (COZAAR) 50 mg tablet 2025  Yes Yes   Sig: in the morning   metFORMIN (GLUCOPHAGE-XR) 500 mg 24 hr tablet Past Week  Yes Yes   Sig: in the morning      Facility-Administered Medications: None     Discharge Medication List as of 7/3/2025 11:46 AM        START taking these medications    Details   doxycycline hyclate (VIBRAMYCIN) 100 mg capsule Take 1 capsule (100 mg total) by mouth every 12 (twelve) hours for 13 days, Starting Thu 7/3/2025, Until 2025, Normal      thiamine 100 MG tablet Take 1 tablet (100 mg total) by mouth daily, Starting Thu 7/3/2025, No Print           CONTINUE these medications which have NOT CHANGED    Details   atorvastatin (LIPITOR) 20 mg tablet Take by mouth in the morning, Starting 2023,  Historical Med      Azelastine HCl 137 MCG/SPRAY SOLN 1-2 sprays into each nostril 2 (two) times a day, Starting Fri 6/20/2025, Historical Med      Empagliflozin 25 MG TABS Take 25 mg by mouth daily, Starting Tue 6/10/2025, Historical Med      metFORMIN (GLUCOPHAGE-XR) 500 mg 24 hr tablet in the morning, Starting Mon 6/5/2023, Historical Med      MILK THISTLE PO Take by mouth in the morning, Historical Med      Multiple Vitamin (multivitamin) tablet Take 1 tablet by mouth in the morning., Historical Med      Multiple Vitamins-Minerals (ZINC PO) Take by mouth in the morning, Historical Med      Probiotic Product (PROBIOTIC PO) Take by mouth in the morning, Historical Med      VITAMIN D PO Take by mouth in the morning, Historical Med           STOP taking these medications       amLODIPine (NORVASC) 5 mg tablet Comments:   Reason for Stopping:         losartan (COZAAR) 50 mg tablet Comments:   Reason for Stopping:             Outpatient Discharge Orders   CBC and differential   Standing Status: Future Standing Exp. Date: 09/03/26     Basic metabolic panel   Standing Status: Future Standing Exp. Date: 09/03/26     Ambulatory Referral to Vascular Surgery   Standing Status: Future Standing Exp. Date: 07/03/26      Discharge Diet     Activity as tolerated     Call provider for:  persistent dizziness or light-headedness     Call provider for:     ED SEPSIS DOCUMENTATION   Time reflects when diagnosis was documented in both MDM as applicable and the Disposition within this note       Time User Action Codes Description Comment    7/1/2025  1:46 PM Caity Hannah [E87.1] Hyponatremia     7/1/2025  1:46 PM Caity Hannah [R53.83] Fatigue     7/1/2025  1:47 PM Caity Hannah [R42] Dizziness     7/2/2025  1:17 PM Stella Pineda [D69.6] Thrombocytopenia (HCC)     7/2/2025  1:17 PM Stella Pineda [R76.8] Lyme disease     7/3/2025  9:55 AM Stella Pineda [F10.10] Alcohol abuse     7/3/2025 10:18 AM Edwin  Stella Anderson [I73.9] PAD (peripheral artery disease) (HCC)                    [1]   Past Medical History:  Diagnosis Date    Colon polyp     Diabetes mellitus (HCC)     Hyperlipidemia     Hypertension     Seasonal allergies    [2]   Past Surgical History:  Procedure Laterality Date    COLONOSCOPY      EYE SURGERY     [3]   Family History  Problem Relation Name Age of Onset    Cancer Mother     [4]   Social History  Tobacco Use    Smoking status: Some Days     Types: Cigars    Smokeless tobacco: Never   Vaping Use    Vaping status: Never Used   Substance Use Topics    Alcohol use: Yes     Comment: daily    Drug use: Never        Caity Hannah MD  07/05/25 2969

## 2025-07-01 NOTE — PLAN OF CARE
Problem: PAIN - ADULT  Goal: Verbalizes/displays adequate comfort level or baseline comfort level  Description: Interventions:  - Encourage patient to monitor pain and request assistance  - Assess pain using appropriate pain scale  - Administer analgesics as ordered based on type and severity of pain and evaluate response  - Implement non-pharmacological measures as appropriate and evaluate response  - Consider cultural and social influences on pain and pain management  - Notify physician/advanced practitioner if interventions unsuccessful or patient reports new pain  - Educate patient/family on pain management process including their role and importance of  reporting pain   - Provide non-pharmacologic/complimentary pain relief interventions  Outcome: Progressing     Problem: INFECTION - ADULT  Goal: Absence or prevention of progression during hospitalization  Description: INTERVENTIONS:  - Assess and monitor for signs and symptoms of infection  - Monitor lab/diagnostic results  - Monitor all insertion sites, i.e. indwelling lines, tubes, and drains  - Monitor endotracheal if appropriate and nasal secretions for changes in amount and color  - [place] appropriate cooling/warming therapies per order  - Administer medications as ordered  - Instruct and encourage patient and family to use good hand hygiene technique  - Identify and instruct in appropriate isolation precautions for identified infection/condition  Outcome: Progressing     Problem: DISCHARGE PLANNING  Goal: Discharge to home or other facility with appropriate resources  Description: INTERVENTIONS:  - Identify barriers to discharge w/patient and caregiver  - Arrange for needed discharge resources and transportation as appropriate  - Identify discharge learning needs (meds, wound care, etc.)  - Arrange for interpretive services to assist at discharge as needed  - Refer to Case Management Department for coordinating discharge planning if the patient needs  post-hospital services based on physician/advanced practitioner order or complex needs related to functional status, cognitive ability, or social support system  Outcome: Progressing     Problem: Knowledge Deficit  Goal: Patient/family/caregiver demonstrates understanding of disease process, treatment plan, medications, and discharge instructions  Description: Complete learning assessment and assess knowledge base.  Interventions:  - Provide teaching at level of understanding  - Provide teaching via preferred learning methods  Outcome: Progressing

## 2025-07-01 NOTE — ASSESSMENT & PLAN NOTE
Lyme studies positive December 2024 as noted by chart review of [facility].  Patient however states he was told he was negative for Lyme disease and given 1 dose of doxycycline but chart review from [facility] clearly reflects prescription for 2 weeks of doxycycline. Reports that he had classic target lesion and rash at the time  Unclear if some of his current symptoms including thrombocytopenia and generalized fatigue could also be attributed to this  Recheck Lyme and consider d/w ID if patient was in fact not compliant with the prescribed Lyme disease treatment

## 2025-07-01 NOTE — ED ATTENDING ATTESTATION
7/1/2025  IKevon DO, saw and evaluated the patient. I have discussed the patient with the resident/non-physician practitioner and agree with the resident's/non-physician practitioner's findings, Plan of Care, and MDM as documented in the resident's/non-physician practitioner's note, except where noted. All available labs and Radiology studies were reviewed.  I was present for key portions of any procedure(s) performed by the resident/non-physician practitioner and I was immediately available to provide assistance.       At this point I agree with the current assessment done in the Emergency Department.  I have conducted an independent evaluation of this patient a history and physical is as follows:    Patient is a 65-year-old male with a history of diabetes and hypertension who presents with generalized weakness.  Patient states that he was previously on metformin and Jardiance.  His physician switched his medications to glimepiride 5 days ago.  Patient states that he has not felt well since starting the medication.  He describes decreased p.o. intake, nausea, vomiting and generalized weakness.  He also describes lightheadedness when he moves his head or ambulates.  He describes a chronic cough and shortness of breath with certain activities.  However he describes worsening exertional dyspnea over the past few days.  He denies chest pain, fever, chills, lower extremity edema, lower extremity pain, other concerns.    On exam, patient is in no acute distress.  Heart is tachycardic, regular rhythm.  Breath sounds normal.  Abdomen is soft, nontender, nondistended.  No rebound or guarding.  No lower extremity edema.  No calf tenderness.    CMP reveals hyponatremia.  Patient was given an IV fluid bolus for him depletion.  Will hold off on additional normal saline until repeat chemistry.  Imaging is negative for acute intra-abdominal process.  His presentation is consistent with volume depletion and  "hyponatremia.  Will hospitalize for further treatment and observation.    Portions of the above record have been created with voice recognition software.  Occasional wrong word or \"sound alike\" substitutions may have occurred due to the inherent limitations of voice recognition software.  Read the chart carefully and recognize, using context, where substitutions may have occurred.      ED Course         Critical Care Time  Procedures      "

## 2025-07-01 NOTE — ASSESSMENT & PLAN NOTE
Chronic very mildly LFTs with AST 59/ ALT 33.  However bilirubin is notably increased today to 2.41  CAT scan on admission showed hepatic steatosis without evidence of cirrhosis; no biliary ductal dilatation  Trend LFTs daily

## 2025-07-01 NOTE — ASSESSMENT & PLAN NOTE
Lab Results   Component Value Date    HGBA1C 7.6 (H) 12/19/2024   A1c at [facility] 7.2  Recent Labs     07/01/25  0922   POCGLU 202*   Hold metformin and Jardiance.  Discontinue Amaryl which patient reported intolerance to  Monitor on Accu-Cheks with sliding scale coverage    Blood Sugar Average: Last 72 hrs:  (P) 202

## 2025-07-01 NOTE — ASSESSMENT & PLAN NOTE
Patient admits to drinking a couple cases of beer per week for 50 years according to his wife.  Has not been able to drink alcohol for the past 3 days due to his acute illness  Monitor on [facility] protocol, consult catch  Admits to 3/4 of the CAGE questions (no eye opener drink)  Check B12 and folate plus orthostatics given reports of events of standing up and falling in the night

## 2025-07-01 NOTE — ASSESSMENT & PLAN NOTE
Platelet count 67, new compared to 1 year ago  Possibly due to alcohol abuse versus untreated Lyme versus other  Recheck in a.m.  Noted increased monocytes on diff but no mono symptoms

## 2025-07-02 ENCOUNTER — APPOINTMENT (INPATIENT)
Dept: NON INVASIVE DIAGNOSTICS | Facility: HOSPITAL | Age: 65
End: 2025-07-02
Payer: COMMERCIAL

## 2025-07-02 ENCOUNTER — HOSPITAL ENCOUNTER (INPATIENT)
Dept: VASCULAR ULTRASOUND | Facility: HOSPITAL | Age: 65
Discharge: HOME | End: 2025-07-02
Payer: COMMERCIAL

## 2025-07-02 PROBLEM — E03.8 SUBCLINICAL HYPOTHYROIDISM: Status: ACTIVE | Noted: 2025-07-02

## 2025-07-02 PROBLEM — R06.02 SHORTNESS OF BREATH: Status: ACTIVE | Noted: 2025-07-02

## 2025-07-02 LAB
ALBUMIN SERPL BCG-MCNC: 3.1 G/DL (ref 3.5–5)
ALP SERPL-CCNC: 67 U/L (ref 34–104)
ALT SERPL W P-5'-P-CCNC: 40 U/L (ref 7–52)
ANION GAP SERPL CALCULATED.3IONS-SCNC: 7 MMOL/L (ref 4–13)
ANION GAP SERPL CALCULATED.3IONS-SCNC: 7 MMOL/L (ref 4–13)
ANION GAP SERPL CALCULATED.3IONS-SCNC: 9 MMOL/L (ref 4–13)
ANISOCYTOSIS BLD QL SMEAR: PRESENT
AORTIC ROOT: 3.9 CM
AORTIC VALVE MEAN VELOCITY: 18.8 M/S
ASCENDING AORTA: 3.7 CM
AST SERPL W P-5'-P-CCNC: 70 U/L (ref 13–39)
AV AREA BY CONTINUOUS VTI: 1.3 CM2
AV AREA PEAK VELOCITY: 1.2 CM2
AV LVOT MEAN GRADIENT: 2 MMHG
AV LVOT PEAK GRADIENT: 2 MMHG
AV MEAN PRESS GRAD SYS DOP V1V2: 15 MMHG
AV ORIFICE AREA US: 1.3 CM2
AV PEAK GRADIENT: 25 MMHG
AV VELOCITY RATIO: 0.34
AV VMAX SYS DOP: 2.51 M/S
B BURGDOR IGG SERPL QL IA: POSITIVE
B BURGDOR IGG+IGM SER QL IA: POSITIVE
B BURGDOR IGM SERPL QL IA: POSITIVE
BASOPHILS # BLD MANUAL: 0 THOUSAND/UL (ref 0–0.1)
BASOPHILS NFR MAR MANUAL: 0 % (ref 0–1)
BILIRUB DIRECT SERPL-MCNC: 0.57 MG/DL (ref 0–0.2)
BILIRUB SERPL-MCNC: 1.86 MG/DL (ref 0.2–1)
BSA FOR ECHO PROCEDURE: 2.18 M2
BUN SERPL-MCNC: 25 MG/DL (ref 5–25)
BUN SERPL-MCNC: 26 MG/DL (ref 5–25)
BUN SERPL-MCNC: 26 MG/DL (ref 5–25)
CALCIUM SERPL-MCNC: 8 MG/DL (ref 8.4–10.2)
CALCIUM SERPL-MCNC: 8.1 MG/DL (ref 8.4–10.2)
CALCIUM SERPL-MCNC: 8.1 MG/DL (ref 8.4–10.2)
CHLORIDE SERPL-SCNC: 100 MMOL/L (ref 96–108)
CHLORIDE SERPL-SCNC: 98 MMOL/L (ref 96–108)
CHLORIDE SERPL-SCNC: 99 MMOL/L (ref 96–108)
CO2 SERPL-SCNC: 22 MMOL/L (ref 21–32)
CREAT SERPL-MCNC: 0.67 MG/DL (ref 0.6–1.3)
CREAT SERPL-MCNC: 0.67 MG/DL (ref 0.6–1.3)
CREAT SERPL-MCNC: 0.79 MG/DL (ref 0.6–1.3)
DOP CALC AO VTI: 50.04 CM
DOP CALC LVOT AREA: 3.8 CM2
DOP CALC LVOT CARDIAC INDEX: 2.62 L/MIN/M2
DOP CALC LVOT CARDIAC OUTPUT: 5.71 L/MIN
DOP CALC LVOT DIAMETER: 2.2 CM
DOP CALC LVOT PEAK VEL VTI: 17.14 CM
DOP CALC LVOT PEAK VEL: 0.77 M/S
DOP CALC LVOT STROKE INDEX: 30.7 ML/M2
DOP CALC LVOT STROKE VOLUME: 65.12
E WAVE DECELERATION TIME: 124 MS
E/A RATIO: 0.64
EOSINOPHIL # BLD MANUAL: 0 THOUSAND/UL (ref 0–0.4)
EOSINOPHIL NFR BLD MANUAL: 0 % (ref 0–6)
ERYTHROCYTE [DISTWIDTH] IN BLOOD BY AUTOMATED COUNT: 13.9 % (ref 11.6–15.1)
FOLATE SERPL-MCNC: >22.3 NG/ML
FRACTIONAL SHORTENING: 34 (ref 28–44)
GFR SERPL CREATININE-BSD FRML MDRD: 100 ML/MIN/1.73SQ M
GFR SERPL CREATININE-BSD FRML MDRD: 100 ML/MIN/1.73SQ M
GFR SERPL CREATININE-BSD FRML MDRD: 94 ML/MIN/1.73SQ M
GLUCOSE SERPL-MCNC: 112 MG/DL (ref 65–140)
GLUCOSE SERPL-MCNC: 119 MG/DL (ref 65–140)
GLUCOSE SERPL-MCNC: 145 MG/DL (ref 65–140)
GLUCOSE SERPL-MCNC: 164 MG/DL (ref 65–140)
GLUCOSE SERPL-MCNC: 172 MG/DL (ref 65–140)
GLUCOSE SERPL-MCNC: 187 MG/DL (ref 65–140)
GLUCOSE SERPL-MCNC: 195 MG/DL (ref 65–140)
HCT VFR BLD AUTO: 34.4 % (ref 36.5–49.3)
HGB BLD-MCNC: 11.7 G/DL (ref 12–17)
INTERVENTRICULAR SEPTUM IN DIASTOLE (PARASTERNAL SHORT AXIS VIEW): 1.1 CM
INTERVENTRICULAR SEPTUM: 1.1 CM (ref 0.6–1.1)
LAAS-AP2: 20 CM2
LAAS-AP4: 20.6 CM2
LEFT ATRIUM AREA SYSTOLE SINGLE PLANE A4C: 22.7 CM2
LEFT ATRIUM SIZE: 4.9 CM
LEFT ATRIUM VOLUME (MOD BIPLANE): 65 ML
LEFT ATRIUM VOLUME INDEX (MOD BIPLANE): 29.8 ML/M2
LEFT INTERNAL DIMENSION IN SYSTOLE: 2.9 CM (ref 2.1–4)
LEFT VENTRICULAR INTERNAL DIMENSION IN DIASTOLE: 4.4 CM (ref 3.5–6)
LEFT VENTRICULAR POSTERIOR WALL IN END DIASTOLE: 1.2 CM
LEFT VENTRICULAR STROKE VOLUME: 54 ML
LV EF US.2D.A4C+ESTIMATED: 57 %
LVSV (TEICH): 54 ML
LYMPHOCYTES # BLD AUTO: 1.59 THOUSAND/UL (ref 0.6–4.47)
LYMPHOCYTES # BLD AUTO: 26 % (ref 14–44)
MCH RBC QN AUTO: 31.1 PG (ref 26.8–34.3)
MCHC RBC AUTO-ENTMCNC: 34 G/DL (ref 31.4–37.4)
MCV RBC AUTO: 92 FL (ref 82–98)
MONOCYTES # BLD AUTO: 1.64 THOUSAND/UL (ref 0–1.22)
MONOCYTES NFR BLD: 30 % (ref 4–12)
MV E'TISSUE VEL-SEP: 7 CM/S
MV PEAK A VEL: 0.76 M/S
MV PEAK E VEL: 49 CM/S
MV STENOSIS PRESSURE HALF TIME: 36 MS
MV VALVE AREA P 1/2 METHOD: 6.11
MYELOCYTE ABSOLUTE CT: 0.05 THOUSAND/UL (ref 0–0.1)
MYELOCYTES NFR BLD MANUAL: 1 % (ref 0–1)
NEUTROPHILS # BLD MANUAL: 2.19 THOUSAND/UL (ref 1.85–7.62)
NEUTS BAND NFR BLD MANUAL: 1 % (ref 0–8)
NEUTS SEG NFR BLD AUTO: 39 % (ref 43–75)
PLATELET # BLD AUTO: 82 THOUSANDS/UL (ref 149–390)
PLATELET BLD QL SMEAR: ABNORMAL
PMV BLD AUTO: 9.6 FL (ref 8.9–12.7)
POLYCHROMASIA BLD QL SMEAR: PRESENT
POTASSIUM SERPL-SCNC: 3.8 MMOL/L (ref 3.5–5.3)
POTASSIUM SERPL-SCNC: 3.9 MMOL/L (ref 3.5–5.3)
POTASSIUM SERPL-SCNC: 4.1 MMOL/L (ref 3.5–5.3)
PROT SERPL-MCNC: 7.1 G/DL (ref 6.4–8.4)
RBC # BLD AUTO: 3.76 MILLION/UL (ref 3.88–5.62)
RBC MORPH BLD: PRESENT
RIGHT ATRIUM AREA SYSTOLE A4C: 15.1 CM2
RIGHT VENTRICLE ID DIMENSION: 3.7 CM
SINOTUBULAR JUNCTION: 3.3 CM
SL CV LEFT ATRIUM LENGTH A2C: 5.1 CM
SL CV LV EF: 65
SL CV PED ECHO LEFT VENTRICLE DIASTOLIC VOLUME (MOD BIPLANE) 2D: 87 ML
SL CV PED ECHO LEFT VENTRICLE SYSTOLIC VOLUME (MOD BIPLANE) 2D: 33 ML
SL CV SINUS OF VALSALVA 2D: 4.2 CM
SODIUM SERPL-SCNC: 128 MMOL/L (ref 135–147)
SODIUM SERPL-SCNC: 129 MMOL/L (ref 135–147)
SODIUM SERPL-SCNC: 129 MMOL/L (ref 135–147)
STJ: 3.3 CM
TRICUSPID ANNULAR PLANE SYSTOLIC EXCURSION: 2.2 CM
VARIANT LYMPHS # BLD AUTO: 3 %
WBC # BLD AUTO: 5.48 THOUSAND/UL (ref 4.31–10.16)

## 2025-07-02 PROCEDURE — 93306 TTE W/DOPPLER COMPLETE: CPT | Performed by: INTERNAL MEDICINE

## 2025-07-02 PROCEDURE — 93306 TTE W/DOPPLER COMPLETE: CPT

## 2025-07-02 PROCEDURE — 93970 EXTREMITY STUDY: CPT | Performed by: SURGERY

## 2025-07-02 PROCEDURE — 93970 EXTREMITY STUDY: CPT

## 2025-07-02 PROCEDURE — 99232 SBSQ HOSP IP/OBS MODERATE 35: CPT

## 2025-07-02 PROCEDURE — 85007 BL SMEAR W/DIFF WBC COUNT: CPT | Performed by: PHYSICIAN ASSISTANT

## 2025-07-02 PROCEDURE — 80048 BASIC METABOLIC PNL TOTAL CA: CPT | Performed by: PHYSICIAN ASSISTANT

## 2025-07-02 PROCEDURE — 80076 HEPATIC FUNCTION PANEL: CPT | Performed by: PHYSICIAN ASSISTANT

## 2025-07-02 PROCEDURE — 85027 COMPLETE CBC AUTOMATED: CPT | Performed by: PHYSICIAN ASSISTANT

## 2025-07-02 PROCEDURE — 82948 REAGENT STRIP/BLOOD GLUCOSE: CPT

## 2025-07-02 PROCEDURE — 80048 BASIC METABOLIC PNL TOTAL CA: CPT

## 2025-07-02 RX ORDER — SODIUM CHLORIDE 9 MG/ML
75 INJECTION, SOLUTION INTRAVENOUS CONTINUOUS
Status: DISCONTINUED | OUTPATIENT
Start: 2025-07-02 | End: 2025-07-02

## 2025-07-02 RX ORDER — DOXYCYCLINE 100 MG/1
100 CAPSULE ORAL EVERY 12 HOURS SCHEDULED
Status: DISCONTINUED | OUTPATIENT
Start: 2025-07-02 | End: 2025-07-03 | Stop reason: HOSPADM

## 2025-07-02 RX ADMIN — DOXYCYCLINE 100 MG: 100 CAPSULE ORAL at 14:27

## 2025-07-02 RX ADMIN — AZELASTINE 1 SPRAY: 1 SPRAY, METERED NASAL at 09:44

## 2025-07-02 RX ADMIN — AZELASTINE 1 SPRAY: 1 SPRAY, METERED NASAL at 17:21

## 2025-07-02 RX ADMIN — INSULIN LISPRO 1 UNITS: 100 INJECTION, SOLUTION INTRAVENOUS; SUBCUTANEOUS at 17:21

## 2025-07-02 RX ADMIN — ATORVASTATIN CALCIUM 20 MG: 20 TABLET, FILM COATED ORAL at 17:20

## 2025-07-02 RX ADMIN — DOXYCYCLINE 100 MG: 100 CAPSULE ORAL at 21:57

## 2025-07-02 RX ADMIN — Medication 100 MG: at 09:43

## 2025-07-02 NOTE — ASSESSMENT & PLAN NOTE
Chronic very mildly LFTs with AST 59/ ALT 33.  However bilirubin is notably increased today to 2.41  CAT scan on admission showed hepatic steatosis without evidence of cirrhosis; no biliary ductal dilatation  No abdominal pain   Continue to monitor

## 2025-07-02 NOTE — PLAN OF CARE
Problem: PAIN - ADULT  Goal: Verbalizes/displays adequate comfort level or baseline comfort level  Description: Interventions:  - Encourage patient to monitor pain and request assistance  - Assess pain using appropriate pain scale  - Administer analgesics as ordered based on type and severity of pain and evaluate response  - Implement non-pharmacological measures as appropriate and evaluate response  - Consider cultural and social influences on pain and pain management  - Notify physician/advanced practitioner if interventions unsuccessful or patient reports new pain  - Educate patient/family on pain management process including their role and importance of  reporting pain   - Provide non-pharmacologic/complimentary pain relief interventions  Outcome: Progressing     Problem: INFECTION - ADULT  Goal: Absence or prevention of progression during hospitalization  Description: INTERVENTIONS:  - Assess and monitor for signs and symptoms of infection  - Monitor lab/diagnostic results  - Monitor all insertion sites, i.e. indwelling lines, tubes, and drains  - Monitor endotracheal if appropriate and nasal secretions for changes in amount and color  - [place] appropriate cooling/warming therapies per order  - Administer medications as ordered  - Instruct and encourage patient and family to use good hand hygiene technique  - Identify and instruct in appropriate isolation precautions for identified infection/condition  Outcome: Progressing     Problem: DISCHARGE PLANNING  Goal: Discharge to home or other facility with appropriate resources  Description: INTERVENTIONS:  - Identify barriers to discharge w/patient and caregiver  - Arrange for needed discharge resources and transportation as appropriate  - Identify discharge learning needs (meds, wound care, etc.)  - Arrange for interpretive services to assist at discharge as needed  - Refer to Case Management Department for coordinating discharge planning if the patient needs  post-hospital services based on physician/advanced practitioner order or complex needs related to functional status, cognitive ability, or social support system  Outcome: Progressing     Problem: Knowledge Deficit  Goal: Patient/family/caregiver demonstrates understanding of disease process, treatment plan, medications, and discharge instructions  Description: Complete learning assessment and assess knowledge base.  Interventions:  - Provide teaching at level of understanding  - Provide teaching via preferred learning methods  Outcome: Progressing

## 2025-07-02 NOTE — ASSESSMENT & PLAN NOTE
Patient presented with generalized weakness, fatigue, lightheadedness and significant decreased appetite. Also episodes of nausea and vomiting noted in the past 2 days. Reports he was started on glimeperide 6/21 and has had these symptoms since then  No recent labs, 1 year ago normal sodium levels   Likely hyponatremia is due to significant decrease in p.o. intake for approximately a week.  Has excessive fluid intake>>> solute intake  Received 1 L of normal saline on admission  Hyponatremia likely multifactorial in the setting of SIADH, beer potomania, and poor oral intake   Correcting appropriately  Add ensure TID, continue 1500ml FR  Repeat BMP in the am

## 2025-07-02 NOTE — UTILIZATION REVIEW
Initial Clinical Review    Admission: Date/Time/Statement:   Admission Orders (From admission, onward)       Ordered        07/01/25 1348  INPATIENT ADMISSION  Once                          Orders Placed This Encounter   Procedures    INPATIENT ADMISSION     Standing Status:   Standing     Number of Occurrences:   1     Level of Care:   Med Surg [16]     Estimated length of stay:   More than 2 Midnights     Certification:   I certify that inpatient services are medically necessary for this patient for a duration of greater than two midnights. See H&P and MD Progress Notes for additional information about the patient's course of treatment.     ED Arrival Information       Expected   -    Arrival   7/1/2025 09:11    Acuity   Urgent              Means of arrival   Walk-In    Escorted by   [place]    Service   Hospitalist    Admission type   Emergency              Arrival complaint   dizziness/weakness/nausea             Chief Complaint   Patient presents with    Weakness - Generalized     Patient states within the last week started on Glimepiride and since then has lost his appetite has been feeling very dizzy and weak        Initial Presentation: 65 y.o. male with hx PMH of hypertension, diabetes , gout R gt toe , longstanding alcohol abuse (Pt drinks at least 2 cases of beer per week- 3 days ago at which point he stopped drinking alcohol because he was feeling ill ) who presents to E D from home  with severe fatigue, generalized weakness and lightheadedness .  . On June 20 his PCP felt his A1c was too high at 7.2 and told him to discontinue his metformin and start glimepiride 1 mg daily. Patient states that as soon as he made this change he immediately did not feel well. However he continued to take the medicine for about 5 days and stopped it around June 26. Despite having stopped the medication he continued to feel ill and his wife notes that he essentially stopped eating entirely. Having nausea and vomiting over  the past 2 days . Back in December patient reports that he had bull's-eye rash following a tick on his right chest wall. On exam, pt appears fatigued  . Awake alert interactive, no tremors.  No confusion . BP low . Labs  : plt 67, Na 129, T bili 2.41, AST 59. D Dimer 4.08 . CT PE study neg. CT A/P  showed hepatic steatosis without evidence of cirrhosis; no biliary ductal dilatationCXR w/o acute findings . Pt given IVF- NSS in ED. Admitted as Inpatient  with hyponatremia , thrombocytopenia - new . Elevated LFT's . Lyme's disease . Plan- BMP q6h . Check osmo studies and TSH . Encourage solute intake as tolerated  . 1800 ml FR Consider nephrology consult . Recheck Plt in am . Recheck Lyme and consider d/w ID if patient was in fact not compliant with the prescribed Lyme disease treatment .Hold home  metformin and Jardiance. Discontinue Amaryl which patient reported intolerance to . SSI, accucheks . CIWA . Trend LFT's daily . Hold home Norvasc and Cozaar w/ borderline hypotension in ED . LE venous duplex.   Anticipated Length of Stay/Certification Statement: Patient will be admitted on an inpatient basis with an anticipated length of stay of greater than 2 midnights secondary to low sodium.     Date: 7/2   Day 2:     Hyponatremia likely multifactorial in the setting of SIADH, beer potomania, and poor oral . Na 128 this am . 1.8 L FR . Encourage solute intake .BMP Q8h .   Plt up to 82 today, no concern for bleeding at this time . Monitor daily CBC .   Venous duplex LE neg for DVT . Lyme IgG and  IgM + . To start po Doxycycline.   If any evidence of carditis, lyme arthritis or tertiary Lyme disease will place formal ID consult . BP's soft- continue to hold Norvasc and Cozaar. T bili down to 1. 86, indirect bili 0.57. AST up to 70 today - continue to trend .Pt w/o abdominal pain .   Reports progressive SOB with exertion. Saturating adequately on room air . Ordered echo .     ED Treatment-Medication Administration from  07/01/2025 0910 to 07/01/2025 1450         Date/Time Order Dose Route Action     07/01/2025 1020 sodium chloride 0.9 % bolus 1,000 mL 1,000 mL Intravenous New Bag     07/01/2025 1124 iohexol (OMNIPAQUE) 350 MG/ML injection (MULTI-DOSE) 100 mL 85 mL Intravenous Given            Scheduled Medications:  atorvastatin, 20 mg, Oral, Daily With Dinner  azelastine, 1 spray, Each Nare, BID  insulin lispro, 1-5 Units, Subcutaneous, TID AC  thiamine, 100 mg, Oral, Daily    doxycycline hyclate (VIBRAMYCIN) capsule 100 mg  Dose: 100 mg  Freq: Every 12 hours scheduled Route: PO  Start: 07/02/25 1330  Continuous IV Infusions:     PRN Meds:  ondansetron, 4 mg, Intravenous, Q6H PRN      ED Triage Vitals   Temperature Pulse Respirations Blood Pressure SpO2 Pain Score   07/01/25 0922 07/01/25 0920 07/01/25 0920 07/01/25 0922 07/01/25 0922 07/01/25 1400   98.2 °F (36.8 °C) 105 20 107/66 95 % No Pain     Weight (last 2 days)       Date/Time Weight    07/01/25 1700 95.3 (210)    07/01/25 0922 95.4 (210.32)            Vital Signs (last 3 days)       Date/Time Temp Pulse Resp BP MAP (mmHg) SpO2 O2 Device Patient Position - Orthostatic VS [place] Coma Scale Score CIWA-Ar Total Pain    07/02/25 07:19:53 98.2 °F (36.8 °C) 90 -- 115/71 86 97 % -- -- -- -- --    07/02/25 0000 -- -- -- -- -- -- -- -- 15 -- No Pain    07/01/25 19:04:28 98.2 °F (36.8 °C) 97 -- 93/61 72 96 % -- -- -- -- --    07/01/25 19:04:16 98.2 °F (36.8 °C) 99 -- 93/61 72 96 % -- -- -- -- --    07/01/25 1700 -- -- -- -- -- -- -- -- 15 -- No Pain    07/01/25 15:22:29 98.2 °F (36.8 °C) 98 18 93/62 72 -- -- -- -- 1 --    07/01/25 1501 -- -- -- -- -- 95 % None (Room air) -- -- -- --    07/01/25 1439 -- 98 20 113/64 84 94 % None (Room air) Lying -- -- --    07/01/25 1430 -- 94 -- 113/64 84 93 % -- -- -- -- --    07/01/25 1400 -- -- -- -- -- -- -- -- -- -- No Pain    07/01/25 1245 -- 98 -- -- -- 93 % -- -- -- -- --    07/01/25 1100 -- 97 -- 115/61 81 93 % -- -- -- -- --    07/01/25  1030 -- 99 -- 96/63 75 95 % -- -- -- -- --    07/01/25 1000 -- 99 -- 99/62 75 93 % -- -- -- -- --    07/01/25 0934 -- -- -- -- -- -- -- -- 15 -- --    07/01/25 0930 -- 100 -- 104/62 78 93 % -- -- -- -- --    07/01/25 0922 98.2 °F (36.8 °C) -- -- 107/66 80 95 % -- -- -- -- --    07/01/25 0920 -- 105 20 -- -- -- -- -- -- -- --           CIWA-Ar Score       Row Name 07/01/25 15:22:29             CIWA-Ar    Pulse 98      Nausea and Vomiting 0      Tactile Disturbances 0      Tremor 0      Auditory Disturbances 0      Paroxysmal Sweats 0      Visual Disturbances 0      Anxiety 1      Headache, Fullness in Head 0      Agitation 0      Orientation and Clouding of Sensorium 0      CIWA-Ar Total 1                      Pertinent Labs/Diagnostic Test Results:   Radiology:  CT pe study w abdomen pelvis w contrast   Final Interpretation by Karan Figueroa MD (07/01 1324)   1.  No pulmonary emboli or other acute thoracic abnormalities.      2.  No acute abdominopelvic findings.            Workstation performed: [id number]         XR chest 2 views   Final Interpretation by Karan Figueroa MD (07/01 1324)   1.  No pulmonary emboli or other acute thoracic abnormalities.      2.  No acute abdominopelvic findings.            Workstation performed: [id number]          VAS VENOUS DUPLEX - LOWER LIMB BILATERAL    (7/2/25)  RIGHT LOWER LIMB:   No evidence of acute or chronic deep vein thrombosis    No evidence of superficial thrombophlebitis noted.   Doppler evaluation shows reflux noted throughout the great saphenous vein.   Popliteal, posterior tibial and anterior tibial arterial Doppler waveform's are biphasic.       LEFT LOWER LIMB:   No evidence of acute or chronic deep vein thrombosis    No evidence of superficial thrombophlebitis noted.   Doppler evaluation shows a normal response to augmentation maneuvers.   Popliteal, posterior tibial and anterior tibial arterial Doppler waveform's are monophasic with evidence of high grade  stenosis vs occlusion of the proximal popliteal artery.        Cardiology:   7/2/25  ECHO- TTE     Left Ventricle: Left ventricular cavity size is normal. Wall thickness is mildly increased. There is mild concentric hypertrophy. The left ventricular ejection fraction is 65%. Systolic function is normal. Wall motion is normal. Diastolic function is mildly abnormal, consistent with grade I (abnormal) relaxation.    Aortic Valve: There is mild stenosis. The aortic valve mean gradient is 15 mmHg. The dimensionless velocity index is 0.34. The aortic valve area is 1.30 cm2. AV mean gradient is 15 mmHg. DVI is 0.34.    Mitral Valve: There is mild annular calcification.    Aorta: The aortic root is mildly dilated. The ascending aorta is mildly dilated. The aortic root is 3.90 cm. The ascending aorta is 3.7 cm    ECG 12 lead   Final Result by Ivan Villalpando MD (07/01 4474)   Sinus tachycardia   Left axis deviation   Abnormal ECG   No previous ECGs available   Confirmed by Ivan Villalpando ([id number]) on 7/1/2025 1:24:04 PM        GI:  No orders to display           Results from last 7 days   Lab Units 07/02/25  0451 07/01/25  1018   WBC Thousand/uL 5.48 6.01   HEMOGLOBIN g/dL 11.7* 13.1   HEMATOCRIT % 34.4* 38.0   PLATELETS Thousands/uL 82* 67*   BANDS PCT % 1 2         Results from last 7 days   Lab Units 07/02/25  0451 07/01/25  2129 07/01/25  1741 07/01/25  1541 07/01/25  1018   SODIUM mmol/L 128* 126* 126* 127* 123*   POTASSIUM mmol/L 3.9 4.2 4.3 4.1 3.9   CHLORIDE mmol/L 99 96 95* 96 91*   CO2 mmol/L 22 23 23 23 22   ANION GAP mmol/L 7 7 8 8 10   BUN mg/dL 26* 33* 32* 29* 31*   CREATININE mg/dL 0.67 0.92 0.99 0.92 1.15   EGFR ml/min/1.73sq m 100 86 79 86 66   CALCIUM mg/dL 8.1* 8.2* 8.2* 8.3* 8.4     Results from last 7 days   Lab Units 07/02/25  0451 07/01/25  1018   AST U/L 70* 59*   ALT U/L 40 33   ALK PHOS U/L 67 73   TOTAL PROTEIN g/dL 7.1 8.0   ALBUMIN g/dL 3.1* 3.5   TOTAL BILIRUBIN mg/dL 1.86* 2.41*   BILIRUBIN  DIRECT mg/dL 0.57*  --      Results from last 7 days   Lab Units 07/02/25  0721 07/01/25  2112 07/01/25  1624 07/01/25  0922   POC GLUCOSE mg/dl 119 167* 145* 202*     Results from last 7 days   Lab Units 07/02/25  0451 07/01/25  2129 07/01/25  1741 07/01/25  1541 07/01/25  1018   GLUCOSE RANDOM mg/dL 112 154* 201* 141* 197*     Results from last 7 days   Lab Units 07/01/25  1541   OSMOLALITY, SERUM mmol/     Results from last 7 days   Lab Units 07/01/25  1541   HEMOGLOBIN A1C % 7.5*   EAG mg/dl 169           Results from last 7 days   Lab Units 07/01/25  1018   D-DIMER QUANTITATIVE ug/ml FEU 4.08*     Results from last 7 days   Lab Units 07/01/25  1541   PROTIME seconds 14.8   INR  1.08     Results from last 7 days   Lab Units 07/01/25  1541   TSH 3RD GENERATON uIU/mL 4.925*                   Results from last 7 days   Lab Units 07/01/25  2029 07/01/25  1541   OSMOLALITY, SERUM mmol/KG  --  294   OSMO UR mmol/  --      Results from last 7 days   Lab Units 07/01/25 2029   SODIUM UR mmol/L 21.0             Past Medical History[1]  Present on Admission:  **None**      Admitting Diagnosis: Dizziness [R42]  Hyponatremia [E87.1]  Nausea [R11.0]  Fatigue [R53.83]  Age/Sex: 65 y.o. male    Network Utilization Review Department  ATTENTION: Please call with any questions or concerns to [phone] and carefully listen to the prompts so that you are directed to the right person. All voicemails are confidential.   For Discharge needs, contact Care Management DC Support Team at [phone] opt. 2  Send all requests for admission clinical reviews, approved or denied determinations and any other requests to dedicated fax number below belonging to the campus where the patient is receiving treatment. List of dedicated fax numbers for the Facilities:  FACILITY NAME UR FAX NUMBER   ADMISSION DENIALS (Administrative/Medical Necessity) [phone]   DISCHARGE SUPPORT TEAM (NETWORK) [phone]   [facility] CHILD HEALTH  (Maternity/NICU/Pediatrics) [phone]   [facility] [phone]   [facility] [phone]   [facility] [phone]   [facility] [phone]   [facility] [phone]   [facility] [phone]   [facility] [phone]   [facility] [phone]   [facility] [phone]   [facility] [phone]   [facility] [phone]   [facility] [phone]              [1]   Past Medical History:  Diagnosis Date    Colon polyp     Diabetes mellitus (HCC)     Hyperlipidemia     Hypertension     Seasonal allergies

## 2025-07-02 NOTE — ASSESSMENT & PLAN NOTE
Per discussion with his PCPs office he is on Norvasc 5 mg daily and Cozaar 50 mg twice a day  Due to labile blood pressures  BP reviewed and soft, continue holding above

## 2025-07-02 NOTE — PROGRESS NOTES
Progress Note - Hospitalist   Name: Robbei Cordero 65 y.o. male I MRN: [id number]  Unit/Bed#: W -01 I Date of Admission: 7/1/2025   Date of Service: 7/2/2025 I Hospital Day: 1    Assessment & Plan  Hyponatremia  Patient presented with generalized weakness, fatigue, lightheadedness and significant decreased appetite. Also episodes of nausea and vomiting noted in the past 2 days. Reports he was started on glimeperide 6/21 and has had these symptoms since then  No recent labs, 1 year ago normal sodium levels   Likely hyponatremia is due to significant decrease in p.o. intake for approximately a week.  Has excessive fluid intake>>> solute intake  Received 1 L of normal saline on admission  Hyponatremia likely multifactorial in the setting of SIADH, beer potomania, and poor oral intake   Correcting appropriately  Add ensure TID, continue 1500ml FR  Repeat BMP in the am   Shortness of breath  Reports progressive SOB with exertion. Saturating adequately on room air   CT PE study w/o PE or other acute thoracic abnormalities  Bilateral venous duplex negative for acute DVT  No prior ECHO on file  ECHO ordered, f/u results    Thrombocytopenia (HCC)  Platelet count 67, new compared to 1 year ago  Possibly due to heavy alcohol use versus untreated Lyme versus other  Slightly improved today at 82  No concern for bleeding at this time   Continue to monitor on CBC daily  Lyme disease  Lyme studies positive December 2024 as noted by chart review of [facility].  Patient however states he was told he was negative for Lyme disease and given 1 dose of doxycycline but chart review from [facility] clearly reflects prescription for 2 weeks of doxycycline. Reports that he had classic target lesion and rash at the time  Unclear if some of his current symptoms including thrombocytopenia and generalized fatigue could also be attributed to this  Lyme IgG and IgM positive   Curbside to ID -- recommend starting treatment with doxy  100mg BID x10-14 days. If any evidence of carditis, lyme arthritis or tertiary Lyme disease will place formal consult  DM2 (diabetes mellitus, type 2) (HCC)  Lab Results   Component Value Date    HGBA1C 7.5 (H) 07/01/2025     Recent Labs     07/01/25  1624 07/01/25  2112 07/02/25  0721 07/02/25  1105   POCGLU 145* 167* 119 145*   Hold metformin and Jardiance.  Discontinue Amaryl which patient reported intolerance to  Monitor on Accu-Cheks with sliding scale coverage    Blood Sugar Average: Last 72 hrs:  (P) 155.6    Elevated LFTs  Chronic very mildly LFTs with AST 59/ ALT 33.  However bilirubin is notably increased today to 2.41  CAT scan on admission showed hepatic steatosis without evidence of cirrhosis; no biliary ductal dilatation  No abdominal pain   Continue to monitor   Alcohol abuse  Patient admits to drinking a couple cases of beer per week for 50 years according to his wife.  Has not been able to drink alcohol for the past 3 days due to his acute illness  Monitor on [facility] protocol, consult catch  Admits to 3/4 of the CAGE questions (no eye opener drink)  B12 and folate WNL  HTN (hypertension)  Per discussion with his PCPs office he is on Norvasc 5 mg daily and Cozaar 50 mg twice a day  Due to labile blood pressures  BP reviewed and soft, continue holding above   Subclinical hypothyroidism  TSH elevated, normal T4  Outpatient f/u for repeat labs in 4-6 weeks     VTE Pharmacologic Prophylaxis: VTE Score: 3 Moderate Risk (Score 3-4) - Pharmacological DVT Prophylaxis Contraindicated. Sequential Compression Devices Ordered.    Mobility:   Basic Mobility Inpatient Raw Score: 24  JH-HLM Goal: 8: Walk 250 feet or more  JH-HLM Achieved: 8: Walk 250 feet ot more  JH-HLM Goal achieved. Continue to encourage appropriate mobility.    Patient Centered Rounds: I performed bedside rounds with nursing staff today.   Discussions with Specialists or Other Care Team Provider: ID    Education and Discussions with Family /  Patient: Updated  (wife) via phone.    Current Length of Stay: 1 day(s)  Current Patient Status: Inpatient   Certification Statement: The patient will continue to require additional inpatient hospital stay due to continued sodium monitoring, platelet monitoring, pending safe dispo  Discharge Plan: Anticipate discharge in 24-48 hrs to home.    Code Status: Level 1 - Full Code    Subjective   Seen and examined. No acute events overnight. Reports feeling well. States that he has had the symptoms since starting glimepiride.  He reports poor oral intake prior to hospitalization. He is tolerating oral intake here. He denies any dizziness, or lightheadedness. No nausea/vomiting or diarrhea.    Wife states that patient only had 1 dose of doxy when he got a tick bite because she states they were told he was negative for lymes disease. Per chart review, appears patient was encouraged    Objective :  Temp:  [97.7 °F (36.5 °C)-98.2 °F (36.8 °C)] 97.7 °F (36.5 °C)  HR:  [90-99] 95  BP: ()/(61-71) 126/69  Resp:  [18-20] 18  SpO2:  [93 %-97 %] 97 %  O2 Device: None (Room air)    Body mass index is 28.48 kg/m².     Input and Output Summary (last 24 hours):     Intake/Output Summary (Last 24 hours) at 7/2/2025 1322  Last data filed at 7/2/2025 0719  Gross per 24 hour   Intake 745 ml   Output 401 ml   Net 344 ml       Physical Exam  Constitutional:       General: He is not in acute distress.     Appearance: He is not toxic-appearing.   HENT:      Head: Normocephalic and atraumatic.      Mouth/Throat:      Mouth: Mucous membranes are moist.     Eyes:      Conjunctiva/sclera: Conjunctivae normal.       Cardiovascular:      Rate and Rhythm: Normal rate.      Heart sounds: Normal heart sounds.   Pulmonary:      Effort: Pulmonary effort is normal.      Breath sounds: Normal breath sounds. No wheezing, rhonchi or rales.   Abdominal:      General: There is no distension.      Palpations: Abdomen is soft.      Tenderness:  There is no abdominal tenderness.     Musculoskeletal:      Cervical back: Normal range of motion.      Right lower leg: No edema.      Left lower leg: No edema.     Skin:     General: Skin is warm.     Neurological:      General: No focal deficit present.      Motor: No tremor.     Psychiatric:         Mood and Affect: Mood normal.       Lines/Drains:              Lab Results: I have reviewed the following results:   Results from last 7 days   Lab Units 07/02/25  0451   WBC Thousand/uL 5.48   HEMOGLOBIN g/dL 11.7*   HEMATOCRIT % 34.4*   PLATELETS Thousands/uL 82*   BANDS PCT % 1   LYMPHO PCT % 26   MONO PCT % 30*   EOS PCT % 0     Results from last 7 days   Lab Units 07/02/25  1210 07/02/25  0451   SODIUM mmol/L 129* 128*   POTASSIUM mmol/L 3.8 3.9   CHLORIDE mmol/L 98 99   CO2 mmol/L 22 22   BUN mg/dL 26* 26*   CREATININE mg/dL 0.79 0.67   ANION GAP mmol/L 9 7   CALCIUM mg/dL 8.1* 8.1*   ALBUMIN g/dL  --  3.1*   TOTAL BILIRUBIN mg/dL  --  1.86*   ALK PHOS U/L  --  67   ALT U/L  --  40   AST U/L  --  70*   GLUCOSE RANDOM mg/dL 164* 112     Results from last 7 days   Lab Units 07/01/25  1541   INR  1.08     Results from last 7 days   Lab Units 07/02/25  1105 07/02/25  0721 07/01/25  2112 07/01/25  1624 07/01/25  0922   POC GLUCOSE mg/dl 145* 119 167* 145* 202*     Results from last 7 days   Lab Units 07/01/25  1541   HEMOGLOBIN A1C % 7.5*           Recent Cultures (last 7 days):         Imaging Results Review: I reviewed radiology reports from this admission including: chest xray, CT abdomen/pelvis, and procedure reports.  Other Study Results Review: No additional pertinent studies reviewed.    Last 24 Hours Medication List:     Current Facility-Administered Medications:     atorvastatin (LIPITOR) tablet 20 mg, Daily With Dinner    azelastine (ASTELIN) 0.1 % nasal spray 1 spray, BID    doxycycline (VIBRAMYCIN) 100 mg in sodium chloride 0.9 % 100 mL IVPB, Q12H    insulin lispro (HumALOG/ADMELOG) 100 units/mL  subcutaneous injection 1-5 Units, TID AC **AND** Fingerstick Glucose (POCT), TID AC    ondansetron (ZOFRAN) injection 4 mg, Q6H PRN    thiamine tablet 100 mg, Daily    Administrative Statements   Today, Patient Was Seen By: Stella Pineda PA-C    **Please Note: This note may have been constructed using a voice recognition system.**

## 2025-07-02 NOTE — ASSESSMENT & PLAN NOTE
Reports progressive SOB with exertion. Saturating adequately on room air   CT PE study w/o PE or other acute thoracic abnormalities  Bilateral venous duplex negative for acute DVT  No prior ECHO on file  ECHO ordered, f/u results

## 2025-07-02 NOTE — ASSESSMENT & PLAN NOTE
Platelet count 67, new compared to 1 year ago  Possibly due to heavy alcohol use versus untreated Lyme versus other  Slightly improved today at 82  No concern for bleeding at this time   Continue to monitor on CBC daily

## 2025-07-02 NOTE — ASSESSMENT & PLAN NOTE
Lyme studies positive December 2024 as noted by chart review of [facility].  Patient however states he was told he was negative for Lyme disease and given 1 dose of doxycycline but chart review from [facility] clearly reflects prescription for 2 weeks of doxycycline. Reports that he had classic target lesion and rash at the time  Unclear if some of his current symptoms including thrombocytopenia and generalized fatigue could also be attributed to this  Lyme IgG and IgM positive   Curbside to ID -- recommend starting treatment with doxy 100mg BID x10-14 days. If any evidence of carditis, lyme arthritis or tertiary Lyme disease will place formal consult

## 2025-07-02 NOTE — ASSESSMENT & PLAN NOTE
Lab Results   Component Value Date    HGBA1C 7.5 (H) 07/01/2025     Recent Labs     07/01/25  1624 07/01/25  2112 07/02/25  0721 07/02/25  1105   POCGLU 145* 167* 119 145*   Hold metformin and Jardiance.  Discontinue Amaryl which patient reported intolerance to  Monitor on Accu-Cheks with sliding scale coverage    Blood Sugar Average: Last 72 hrs:  (P) 155.6

## 2025-07-02 NOTE — ASSESSMENT & PLAN NOTE
Patient admits to drinking a couple cases of beer per week for 50 years according to his wife.  Has not been able to drink alcohol for the past 3 days due to his acute illness  Monitor on [facility] protocol, consult catch  Admits to 3/4 of the CAGE questions (no eye opener drink)  B12 and folate WNL

## 2025-07-03 VITALS
WEIGHT: 210.1 LBS | HEART RATE: 84 BPM | RESPIRATION RATE: 18 BRPM | BODY MASS INDEX: 28.46 KG/M2 | SYSTOLIC BLOOD PRESSURE: 134 MMHG | OXYGEN SATURATION: 98 % | TEMPERATURE: 98.6 F | HEIGHT: 72 IN | DIASTOLIC BLOOD PRESSURE: 71 MMHG

## 2025-07-03 PROBLEM — R93.89 ABNORMAL ULTRASOUND: Status: ACTIVE | Noted: 2025-07-03

## 2025-07-03 LAB
ALBUMIN SERPL BCG-MCNC: 2.9 G/DL (ref 3.5–5)
ALP SERPL-CCNC: 65 U/L (ref 34–104)
ALT SERPL W P-5'-P-CCNC: 51 U/L (ref 7–52)
ANION GAP SERPL CALCULATED.3IONS-SCNC: 5 MMOL/L (ref 4–13)
AST SERPL W P-5'-P-CCNC: 75 U/L (ref 13–39)
BACTERIA UR QL AUTO: ABNORMAL /HPF
BILIRUB DIRECT SERPL-MCNC: 0.47 MG/DL (ref 0–0.2)
BILIRUB SERPL-MCNC: 1.85 MG/DL (ref 0.2–1)
BILIRUB UR QL STRIP: NEGATIVE
BUN SERPL-MCNC: 21 MG/DL (ref 5–25)
CALCIUM SERPL-MCNC: 8 MG/DL (ref 8.4–10.2)
CHLORIDE SERPL-SCNC: 101 MMOL/L (ref 96–108)
CLARITY UR: CLEAR
CO2 SERPL-SCNC: 25 MMOL/L (ref 21–32)
COLOR UR: YELLOW
CORTIS AM PEAK SERPL-MCNC: 12.1 UG/DL (ref 6.7–22.6)
CREAT SERPL-MCNC: 0.66 MG/DL (ref 0.6–1.3)
ERYTHROCYTE [DISTWIDTH] IN BLOOD BY AUTOMATED COUNT: 13.7 % (ref 11.6–15.1)
GFR SERPL CREATININE-BSD FRML MDRD: 101 ML/MIN/1.73SQ M
GLUCOSE SERPL-MCNC: 134 MG/DL (ref 65–140)
GLUCOSE SERPL-MCNC: 138 MG/DL (ref 65–140)
GLUCOSE SERPL-MCNC: 165 MG/DL (ref 65–140)
GLUCOSE UR STRIP-MCNC: ABNORMAL MG/DL
HCT VFR BLD AUTO: 33.6 % (ref 36.5–49.3)
HGB BLD-MCNC: 11.5 G/DL (ref 12–17)
HGB UR QL STRIP.AUTO: NEGATIVE
KETONES UR STRIP-MCNC: ABNORMAL MG/DL
LEUKOCYTE ESTERASE UR QL STRIP: ABNORMAL
MAGNESIUM SERPL-MCNC: 2.5 MG/DL (ref 1.9–2.7)
MCH RBC QN AUTO: 31.6 PG (ref 26.8–34.3)
MCHC RBC AUTO-ENTMCNC: 34.2 G/DL (ref 31.4–37.4)
MCV RBC AUTO: 92 FL (ref 82–98)
MUCOUS THREADS UR QL AUTO: ABNORMAL
NITRITE UR QL STRIP: NEGATIVE
NON-SQ EPI CELLS URNS QL MICRO: ABNORMAL /HPF
PH UR STRIP.AUTO: 5.5 [PH]
PLATELET # BLD AUTO: 97 THOUSANDS/UL (ref 149–390)
PMV BLD AUTO: 9.5 FL (ref 8.9–12.7)
POTASSIUM SERPL-SCNC: 3.8 MMOL/L (ref 3.5–5.3)
PROT SERPL-MCNC: 6.9 G/DL (ref 6.4–8.4)
PROT UR STRIP-MCNC: ABNORMAL MG/DL
RBC # BLD AUTO: 3.64 MILLION/UL (ref 3.88–5.62)
RBC #/AREA URNS AUTO: ABNORMAL /HPF
SODIUM SERPL-SCNC: 131 MMOL/L (ref 135–147)
SP GR UR STRIP.AUTO: 1.02 (ref 1–1.03)
URATE SERPL-MCNC: 5.4 MG/DL (ref 3.5–8.5)
UROBILINOGEN UR STRIP-ACNC: 4 MG/DL
WBC # BLD AUTO: 5.73 THOUSAND/UL (ref 4.31–10.16)
WBC #/AREA URNS AUTO: ABNORMAL /HPF

## 2025-07-03 PROCEDURE — 80076 HEPATIC FUNCTION PANEL: CPT

## 2025-07-03 PROCEDURE — 80048 BASIC METABOLIC PNL TOTAL CA: CPT | Performed by: INTERNAL MEDICINE

## 2025-07-03 PROCEDURE — 83735 ASSAY OF MAGNESIUM: CPT | Performed by: INTERNAL MEDICINE

## 2025-07-03 PROCEDURE — 81001 URINALYSIS AUTO W/SCOPE: CPT | Performed by: INTERNAL MEDICINE

## 2025-07-03 PROCEDURE — 82533 TOTAL CORTISOL: CPT | Performed by: INTERNAL MEDICINE

## 2025-07-03 PROCEDURE — 82948 REAGENT STRIP/BLOOD GLUCOSE: CPT

## 2025-07-03 PROCEDURE — 99239 HOSP IP/OBS DSCHRG MGMT >30: CPT

## 2025-07-03 PROCEDURE — 85027 COMPLETE CBC AUTOMATED: CPT | Performed by: INTERNAL MEDICINE

## 2025-07-03 PROCEDURE — 84550 ASSAY OF BLOOD/URIC ACID: CPT | Performed by: INTERNAL MEDICINE

## 2025-07-03 RX ORDER — LANOLIN ALCOHOL/MO/W.PET/CERES
100 CREAM (GRAM) TOPICAL DAILY
Start: 2025-07-03

## 2025-07-03 RX ORDER — DOXYCYCLINE 100 MG/1
100 CAPSULE ORAL EVERY 12 HOURS SCHEDULED
Qty: 26 CAPSULE | Refills: 0 | Status: SHIPPED | OUTPATIENT
Start: 2025-07-03 | End: 2025-07-16

## 2025-07-03 RX ADMIN — Medication 100 MG: at 09:46

## 2025-07-03 RX ADMIN — DOXYCYCLINE 100 MG: 100 CAPSULE ORAL at 09:46

## 2025-07-03 RX ADMIN — AZELASTINE 1 SPRAY: 1 SPRAY, METERED NASAL at 09:46

## 2025-07-03 NOTE — ASSESSMENT & PLAN NOTE
Platelet count 67, new compared to 1 year ago  Possibly due to heavy alcohol use versus untreated Lyme versus other  Slightly improved today at 97  No concern for bleeding at this time   Ordered repeat CBC on Monday

## 2025-07-03 NOTE — ASSESSMENT & PLAN NOTE
Patient presented with generalized weakness, fatigue, lightheadedness and significant decreased appetite. Also episodes of nausea and vomiting noted in the past 2 days. Reports he was started on glimeperide 6/21 and has had these symptoms since then  No recent labs, 1 year ago normal sodium levels   Likely hyponatremia is due to significant decrease in p.o. intake for approximately a week.  Has excessive fluid intake>>> solute intake  Received 1 L of normal saline on admission  Hyponatremia likely multifactorial in the setting of SIADH, beer potomania, and poor oral intake   Correcting appropriately  Add ensure TID, continue 1500ml FR  Na 131 this am, stable for discharge   Encourage alcohol cessation/cut back, increased oral intake. Hold glimepiride on discharge. Repeat BMP Monday ordered. Will need to f/u outpatient with PCP

## 2025-07-03 NOTE — ASSESSMENT & PLAN NOTE
Lab Results   Component Value Date    HGBA1C 7.5 (H) 07/01/2025     Recent Labs     07/02/25  1105 07/02/25  1616 07/02/25  2044 07/03/25  0658   POCGLU 145* 187* 172* 138   Hold metformin and Jardiance.  Discontinue Amaryl which patient reported intolerance to  Monitor on Accu-Cheks with sliding scale coverage  Hold glimepiride on discharge  Continue metformin and jardiance     Blood Sugar Average: Last 72 hrs:  (P) 159.375

## 2025-07-03 NOTE — ASSESSMENT & PLAN NOTE
Patient admits to drinking a couple cases of beer per week for 50 years according to his wife.  Has not been able to drink alcohol for the past 3 days due to his acute illness  Monitor on [facility] protocol, consult catch  Admits to 3/4 of the CAGE questions (no eye opener drink)  B12 and folate WNL  Declined outpatient resources

## 2025-07-03 NOTE — ASSESSMENT & PLAN NOTE
Patient with small bowel angiodysplastic lesions chronic blood loss.  Most recent labs reveal improved iron studies and hemoglobin/hematocrit with no evidence of ongoing blood loss.  Can recheck every 3 months given trend over the past 4 months that have not indicated any ongoing blood loss. TSH elevated, normal T4  Outpatient f/u for repeat labs in 4-6 weeks

## 2025-07-03 NOTE — PLAN OF CARE
Problem: PAIN - ADULT  Goal: Verbalizes/displays adequate comfort level or baseline comfort level  Description: Interventions:  - Encourage patient to monitor pain and request assistance  - Assess pain using appropriate pain scale  - Administer analgesics as ordered based on type and severity of pain and evaluate response  - Implement non-pharmacological measures as appropriate and evaluate response  - Consider cultural and social influences on pain and pain management  - Notify physician/advanced practitioner if interventions unsuccessful or patient reports new pain  - Educate patient/family on pain management process including their role and importance of  reporting pain   - Provide non-pharmacologic/complimentary pain relief interventions  Outcome: Progressing     Problem: INFECTION - ADULT  Goal: Absence or prevention of progression during hospitalization  Description: INTERVENTIONS:  - Assess and monitor for signs and symptoms of infection  - Monitor lab/diagnostic results  - Monitor all insertion sites, i.e. indwelling lines, tubes, and drains  - Monitor endotracheal if appropriate and nasal secretions for changes in amount and color  - [place] appropriate cooling/warming therapies per order  - Administer medications as ordered  - Instruct and encourage patient and family to use good hand hygiene technique  - Identify and instruct in appropriate isolation precautions for identified infection/condition  Outcome: Progressing     Problem: DISCHARGE PLANNING  Goal: Discharge to home or other facility with appropriate resources  Description: INTERVENTIONS:  - Identify barriers to discharge w/patient and caregiver  - Arrange for needed discharge resources and transportation as appropriate  - Identify discharge learning needs (meds, wound care, etc.)  - Arrange for interpretive services to assist at discharge as needed  - Refer to Case Management Department for coordinating discharge planning if the patient needs  post-hospital services based on physician/advanced practitioner order or complex needs related to functional status, cognitive ability, or social support system  Outcome: Progressing     Problem: Knowledge Deficit  Goal: Patient/family/caregiver demonstrates understanding of disease process, treatment plan, medications, and discharge instructions  Description: Complete learning assessment and assess knowledge base.  Interventions:  - Provide teaching at level of understanding  - Provide teaching via preferred learning methods  Outcome: Progressing

## 2025-07-03 NOTE — DISCHARGE SUMMARY
"Discharge Summary - Hospitalist   Name: Robbie Cordero 65 y.o. male I MRN: [id number]  Unit/Bed#: W -01 I Date of Admission: 7/1/2025   Date of Service: 7/3/2025 I Hospital Day: 2     Assessment & Plan  Hyponatremia  Patient presented with generalized weakness, fatigue, lightheadedness and significant decreased appetite. Also episodes of nausea and vomiting noted in the past 2 days. Reports he was started on glimeperide 6/21 and has had these symptoms since then  No recent labs, 1 year ago normal sodium levels   Likely hyponatremia is due to significant decrease in p.o. intake for approximately a week.  Has excessive fluid intake>>> solute intake  Received 1 L of normal saline on admission  Hyponatremia likely multifactorial in the setting of SIADH, beer potomania, and poor oral intake   Correcting appropriately  Add ensure TID, continue 1500ml FR  Na 131 this am, stable for discharge   Encourage alcohol cessation/cut back, increased oral intake. Hold glimepiride on discharge. Repeat BMP Monday ordered. Will need to f/u outpatient with PCP  Shortness of breath  Reports progressive SOB with exertion. Saturating adequately on room air.   CT PE study w/o PE or other acute thoracic abnormalities  Bilateral venous duplex negative for acute DVT  No prior ECHO on file  ECHO: EF 65%, systolic function normal, diastolic function mildly abnormal consistent with grade 1 relaxation   Discussed again with patient who states this has been going on for \"years\" however wife reports more noticeable over last 6 months. No associated chest pain/discomfort, diaphoresis, jaw or arm pain.   Reports smoking 1-2 cigars weekly  Patient encouraged outpatient f/u with PCP, consider need for possible stress test   Thrombocytopenia (HCC)  Platelet count 67, new compared to 1 year ago  Possibly due to heavy alcohol use versus untreated Lyme versus other  Slightly improved today at 97  No concern for bleeding at this time   Ordered repeat " CBC on Monday   Lyme disease  Lyme studies positive December 2024 as noted by chart review of [facility].  Patient however states he was told he was negative for Lyme disease and given 1 dose of doxycycline but chart review from [facility] clearly reflects prescription for 2 weeks of doxycycline. Reports that he had classic target lesion and rash at the time  Unclear if some of his current symptoms including thrombocytopenia and generalized fatigue could also be attributed to this  Lyme IgG and IgM positive   Curbside to ID -- recommend starting treatment with doxy 100mg BID x10-14 days. If any evidence of carditis, lyme arthritis or tertiary Lyme disease will place formal consult  Given unclear compliance with prior doxycycline treatment, will treat with doxy 100mg BID x14 days (sent to pharmacy).   Recommended outpatient f/u with PCP in 1-2 weeks of discharge   DM2 (diabetes mellitus, type 2) (HCC)  Lab Results   Component Value Date    HGBA1C 7.5 (H) 07/01/2025     Recent Labs     07/02/25  1105 07/02/25  1616 07/02/25  2044 07/03/25  0658   POCGLU 145* 187* 172* 138   Hold metformin and Jardiance.  Discontinue Amaryl which patient reported intolerance to  Monitor on Accu-Cheks with sliding scale coverage  Hold glimepiride on discharge  Continue metformin and jardiance     Blood Sugar Average: Last 72 hrs:  (P) 159.375    Elevated LFTs  Chronic very mildly LFTs with AST 59/ ALT 33.  However bilirubin is notably increased today to 2.41  CAT scan on admission showed hepatic steatosis without evidence of cirrhosis; no biliary ductal dilatation  No abdominal pain   Continue to monitor   Alcohol abuse  Patient admits to drinking a couple cases of beer per week for 50 years according to his wife.  Has not been able to drink alcohol for the past 3 days due to his acute illness  Monitor on CIWA protocol, consult catch  Admits to 3/4 of the CAGE questions (no eye opener drink)  B12 and folate WNL  Declined  outpatient resources   HTN (hypertension)  Per discussion with his PCPs office he is on Norvasc 5 mg daily and Cozaar 50 mg twice a day  Due to labile blood pressures  BP reviewed and soft  Recommended to continue holding amlodipine and losartan on discharge. Encouraged to check BP throughout the day and keep log to bring to f/u appointment with PCP. If bp readings at home consistently >140, recommend resuming losartan   Subclinical hypothyroidism  TSH elevated, normal T4  Outpatient f/u for repeat labs in 4-6 weeks   Abnormal ultrasound  Venous duplex w/ LLE evidence of high grade stenosis vs. Occlusion of proximal popliteal artery  Continue PTA statin  Vascular surgery amb referral on discharge     Medical Problems       Resolved Problems  Date Reviewed: 7/1/2025   None       Discharging Physician / Practitioner: Stella Pineda PA-C  PCP: Darnell Hawley MD  Admission Date:   Admission Orders (From admission, onward)       Ordered        07/01/25 1348  INPATIENT ADMISSION  Once                          Discharge Date: 07/03/25    Next Steps for Physician/AP Assuming Care:  Repeat CBC and BMP ordered for Monday   Monitor symptoms     Test Results Pending at Discharge (will require follow up):  CBC, BMP Monday     Medication Changes for Discharge & Rationale:   D/c amlodipine, losartan and glimepiride   Start doxy 100mg BID x14 days   See after visit summary for reconciled discharge medications provided to patient and/or family.     Consultations During Hospital Stay:  None     Procedures Performed:   None     Significant Findings / Test Results:   Echo complete w/ contrast if indicated 7/2/2025  Narrative:   Left Ventricle: Left ventricular cavity size is normal. Wall thickness is mildly increased. There is mild concentric hypertrophy. The left ventricular ejection fraction is 65%. Systolic function is normal. Wall motion is normal. Diastolic function is mildly abnormal, consistent with grade I (abnormal)  relaxation.   Aortic Valve: There is mild stenosis. The aortic valve mean gradient is 15 mmHg. The dimensionless velocity index is 0.34. The aortic valve area is 1.30 cm2. AV mean gradient is 15 mmHg. DVI is 0.34.   Mitral Valve: There is mild annular calcification.   Aorta: The aortic root is mildly dilated. The ascending aorta is mildly dilated. The aortic root is 3.90 cm. The ascending aorta is 3.7 cm.     VAS VENOUS DUPLEX - LOWER LIMB BILATERAL 7/2/2025  CONCLUSION:  RIGHT LOWER LIMB: No evidence of acute or chronic deep vein thrombosis  No evidence of superficial thrombophlebitis noted. Doppler evaluation shows reflux noted throughout the great saphenous vein. Popliteal, posterior tibial and anterior tibial arterial Doppler waveform's are biphasic.   LEFT LOWER LIMB: No evidence of acute or chronic deep vein thrombosis  No evidence of superficial thrombophlebitis noted. Doppler evaluation shows a normal response to augmentation maneuvers. Popliteal, posterior tibial and anterior tibial arterial Doppler waveform's are monophasic with evidence of high grade stenosis vs occlusion of the proximal popliteal artery.      XR chest 2 views 7/1/2025  Impression: 1.  No pulmonary emboli or other acute thoracic abnormalities. 2.  No acute abdominopelvic findings.     CT pe study w abdomen pelvis w contrast 7/1/2025  Impression: 1.  No pulmonary emboli or other acute thoracic abnormalities. 2.  No acute abdominopelvic findings.   Incidental Findings:   None      Hospital Course:   Robbie Cordero is a 65 y.o. male patient PMH alcohol use disorder, hypertension, hypothyroidism who originally presented to the hospital on 7/1/2025 due to fatigue, generalized weakness, lightheadedness and decreased appetite reportedly after starting glimepiride.  Workup in the ED remarkable for hyponatremia, thrombocytopenia, and slightly elevated LFTs.  Patient additionally underwent repeat IgM IgG Lyme titers given prior history and  "unclear completed treatment.  Both IgM and IgG Lyme titers positive.  [place] discussed with infectious disease who recommended given uncertainty that treatment was complete, recommend treating with Doxy 100 mg twice daily.  During hospitalization his blood pressure medications were placed on hold for hypotension on admission.  They remained on hold given labile blood pressures.  He is recommended to check his blood pressures outpatient and if consistently above 140 mmHg, can resume losartan.  He was also evaluated for his shortness of breath.  He underwent a CT PE study without evidence of PE, and echo without evidence of heart failure.  After further discussion of the shortness of breath, patient reports has been going on for \"years.\"  His sodium was monitored closely and resolved to 131 on discharge after treatment with IV bolus, fluid restriction, and ensures. He is feeling well and stable for discharge at this time.  He is recommended alcohol cessation, or at very most to cut back.  We discussed return to ED precautions.  He is recommended a repeat BMP and CBC on Monday.  He is also recommended follow-up with his PCP in 1 to 2 weeks of discharge.  He should continue holding glimepiride on discharge. He should additionally continue FR 1.5L on discharge.  Ambulatory referral has been placed to vascular surgery for for venous duplex findings suggestive of  high grade stenosis vs. occlusoin of proximal popliteal artery     Please see above list of diagnoses and related plan for additional information.     Discharge Day Visit / Exam:   Subjective: Seen and examined.  No acute events overnight.  Patient reports feeling well.  He is eager for discharge.  We discussed discharging off of blood pressure medications in which patient expressed some concern.  We discussed keeping a blood pressure log and resuming losartan if blood pressures are consistently above 140.  He denies chest pain, nausea/vomiting diarrhea, fevers " chills, diaphoresis.   Vitals: Blood Pressure: 134/71 (07/03/25 0700)  Pulse: 84 (07/03/25 0700)  Temperature: 98.6 °F (37 °C) (07/03/25 0700)  Respirations: 18 (07/03/25 0700)  Height: 6' (182.9 cm) (07/02/25 1345)  Weight - Scale: 95.3 kg (210 lb 1.6 oz) (07/02/25 1345)  SpO2: 98 % (07/03/25 0700)  Physical Exam  Constitutional:       General: He is not in acute distress.     Appearance: He is not toxic-appearing.   HENT:      Head: Normocephalic and atraumatic.      Nose: Nose normal.      Mouth/Throat:      Mouth: Mucous membranes are moist.     Eyes:      Conjunctiva/sclera: Conjunctivae normal.       Cardiovascular:      Rate and Rhythm: Normal rate.      Heart sounds: Normal heart sounds. No murmur heard.     No friction rub. No gallop.   Pulmonary:      Effort: Pulmonary effort is normal.      Breath sounds: No wheezing, rhonchi or rales.   Abdominal:      General: There is no distension.      Palpations: Abdomen is soft.      Tenderness: There is no abdominal tenderness.     Musculoskeletal:         General: Normal range of motion.      Cervical back: Normal range of motion.      Right lower leg: No edema.      Left lower leg: No edema.     Skin:     General: Skin is warm and dry.     Neurological:      General: No focal deficit present.     Psychiatric:         Mood and Affect: Mood normal.         Discussion with Family: Updated  (wife) via phone.    Discharge instructions/Information to patient and family:   See after visit summary for information provided to patient and family.      Provisions for Follow-Up Care:  See after visit summary for information related to follow-up care and any pertinent home health orders.      Mobility at time of Discharge:   Basic Mobility Inpatient Raw Score: 24  JH-HLM Goal: 8: Walk 250 feet or more  JH-HLM Achieved: 8: Walk 250 feet ot more  HLM Goal achieved. Continue to encourage appropriate mobility.     Disposition:   Home    Planned Readmission: None      Administrative Statements   Discharge Statement:  I have spent a total time of 46 minutes in caring for this patient on the day of the visit/encounter. >30 minutes of time was spent on: Diagnostic results, Prognosis, Risks and benefits of tx options, Instructions for management, Risk factor reductions, Impressions, Counseling / Coordination of care, Documenting in the medical record, Reviewing / ordering tests, medicine, procedures  , and Communicating with other healthcare professionals .    **Please Note: This note may have been constructed using a voice recognition system**

## 2025-07-03 NOTE — ASSESSMENT & PLAN NOTE
Per discussion with his PCPs office he is on Norvasc 5 mg daily and Cozaar 50 mg twice a day  Due to labile blood pressures  BP reviewed and soft  Recommended to continue holding amlodipine and losartan on discharge. Encouraged to check BP throughout the day and keep log to bring to f/u appointment with PCP. If bp readings at home consistently >140, recommend resuming losartan

## 2025-07-03 NOTE — DISCHARGE INSTR - AVS FIRST PAGE
Dear Robbie Cordero,     It was our pleasure to care for you here at [facility].  It is our hope that we were always able to meet and exceed the expected standards for your care during your stay.  You were hospitalized due to acute hyponatremia .  You were cared for on the 4th floor under the service of Stella Pineda PA-C with the [facility] who covers for your primary care physician (PCP), Darnell Hawley MD, while you were hospitalized.  If you have any questions or concerns related to this hospitalization, you may contact us at .  For follow up, we recommend that you follow up with your primary care physician.  Please review this entire discharge summary as additional general instructions may be provided later as well.  However, at this time we provide for you here, the most important instructions / recommendations at discharge:     Your sodium levels have improved to 131 with a 1.5L fluid restriction and increased solute intake. Please continue this on discharge.   You will be sent home with doxycycline 100mg to be taken twice daily for 14 days. Please continue antibiotic until finished.   You should hold any further doses of glimepiride on discharge  Your amlodipine and losartan have been discontinued for low blood pressure during your hospitalization. Please continue holding these medications on discharge. I recommend checking your blood pressure throughout the day and keeping a log to bring to your follow up PCP appointment so they can determine further need for blood pressure medications. If your top number (systolic blood pressure) is greater than 140 consistently, you may resume your losartan.   I have ordered a cbc and bmp (labs) to be completed in 4 days (Monday) - you should follow up results with your primary care physician   Please continue a 1.5L fluid restriction, reduce alcohol consumption and ensure you are eating protein  Return to  the emergency room with any worsening symptoms, confusion, syncope, dizziness, chest pain or shortness of breath       Sincerely,     Stella Pineda PA-C

## 2025-07-03 NOTE — ASSESSMENT & PLAN NOTE
"Reports progressive SOB with exertion. Saturating adequately on room air.   CT PE study w/o PE or other acute thoracic abnormalities  Bilateral venous duplex negative for acute DVT  No prior ECHO on file  ECHO: EF 65%, systolic function normal, diastolic function mildly abnormal consistent with grade 1 relaxation   Discussed again with patient who states this has been going on for \"years\" however wife reports more noticeable over last 6 months. No associated chest pain/discomfort, diaphoresis, jaw or arm pain.   Reports smoking 1-2 cigars weekly  Patient encouraged outpatient f/u with PCP, consider need for possible stress test   "

## 2025-07-03 NOTE — ASSESSMENT & PLAN NOTE
Venous duplex w/ LLE evidence of high grade stenosis vs. Occlusion of proximal popliteal artery  Continue PTA statin  Vascular surgery amb referral on discharge

## 2025-07-03 NOTE — ASSESSMENT & PLAN NOTE
Lyme studies positive December 2024 as noted by chart review of [facility].  Patient however states he was told he was negative for Lyme disease and given 1 dose of doxycycline but chart review from [facility] clearly reflects prescription for 2 weeks of doxycycline. Reports that he had classic target lesion and rash at the time  Unclear if some of his current symptoms including thrombocytopenia and generalized fatigue could also be attributed to this  Lyme IgG and IgM positive   Curbside to ID -- recommend starting treatment with doxy 100mg BID x10-14 days. If any evidence of carditis, lyme arthritis or tertiary Lyme disease will place formal consult  Given unclear compliance with prior doxycycline treatment, will treat with doxy 100mg BID x14 days (sent to pharmacy).   Recommended outpatient f/u with PCP in 1-2 weeks of discharge

## 2025-07-07 NOTE — UTILIZATION REVIEW
NOTIFICATION OF ADMISSION DISCHARGE   This is a Notification of Discharge from [facility]. Please be advised that this patient has been discharge from our facility. Below you will find the admission and discharge date and time including the patient’s disposition.   UTILIZATION REVIEW CONTACT:  Utilization Review Assistants  Network Utilization Review Department  Phone: [phone] x carefully listen to the prompts. All voicemails are confidential.  Email: [email or web address]     ADMISSION INFORMATION  PRESENTATION DATE: 7/1/2025  9:17 AM  OBERVATION ADMISSION DATE: N/A  INPATIENT ADMISSION DATE: 7/1/25  1:48 PM   DISCHARGE DATE: 7/3/2025 12:44 PM   DISPOSITION:Home/Self Care    Network Utilization Review Department  ATTENTION: Please call with any questions or concerns to [phone] and carefully listen to the prompts so that you are directed to the right person. All voicemails are confidential.   For Discharge needs, contact Care Management DC Support Team at [phone] opt. 2  Send all requests for admission clinical reviews, approved or denied determinations and any other requests to dedicated fax number below belonging to the campus where the patient is receiving treatment. List of dedicated fax numbers for the Facilities:  FACILITY NAME UR FAX NUMBER   ADMISSION DENIALS (Administrative/Medical Necessity) [phone]   DISCHARGE SUPPORT TEAM ([facility]) [phone]   PARENT CHILD HEALTH (Maternity/NICU/Pediatrics) [phone]   [facility] [phone]   [facility] [phone]   [facility] [phone]   [facility] [phone]   [facility] [phone]   [facility] [phone]   [facility] [phone]   [facility] [phone]   [facility]  [facility] [phone]   [facility] [phone]   [facility] [phone]   [facility] [phone]

## 2025-07-09 PROBLEM — F10.90 ALCOHOL USE DISORDER: Status: ACTIVE | Noted: 2025-07-01
